# Patient Record
Sex: FEMALE | Race: WHITE | Employment: FULL TIME | URBAN - METROPOLITAN AREA
[De-identification: names, ages, dates, MRNs, and addresses within clinical notes are randomized per-mention and may not be internally consistent; named-entity substitution may affect disease eponyms.]

---

## 2021-11-09 ENCOUNTER — HOSPITAL ENCOUNTER (OUTPATIENT)
Dept: MAMMOGRAPHY | Facility: CLINIC | Age: 31
Discharge: HOME | End: 2021-11-09
Payer: COMMERCIAL

## 2021-11-09 ENCOUNTER — HOSPITAL ENCOUNTER (OUTPATIENT)
Dept: ULTRASOUND IMAGING | Facility: CLINIC | Age: 31
Discharge: HOME | End: 2021-11-09
Payer: COMMERCIAL

## 2021-11-09 VITALS — WEIGHT: 203 LBS | BODY MASS INDEX: 32.62 KG/M2 | HEIGHT: 66 IN

## 2021-11-09 DIAGNOSIS — N64.4 BREAST PAIN: ICD-10-CM

## 2021-11-09 DIAGNOSIS — R92.8 ABNORMAL MAMMOGRAM: ICD-10-CM

## 2021-11-09 DIAGNOSIS — N64.4 BREAST PAIN, LEFT: ICD-10-CM

## 2021-11-09 PROCEDURE — 77066 DX MAMMO INCL CAD BI: CPT

## 2021-11-09 PROCEDURE — 76642 ULTRASOUND BREAST LIMITED: CPT

## 2021-11-09 PROCEDURE — G0279 TOMOSYNTHESIS, MAMMO: HCPCS

## 2023-01-26 ENCOUNTER — TELEPHONE (OUTPATIENT)
Dept: OBGYN CLINIC | Facility: CLINIC | Age: 33
End: 2023-01-26

## 2023-01-26 NOTE — TELEPHONE ENCOUNTER
1/26/23-Pt to have last pap and progress notes faxed from [facility] prior to her WA. She will be completing a MR release form for us to fax to  OB/GYN to get info from her miscarriage.

## 2023-03-27 ENCOUNTER — TELEPHONE (OUTPATIENT)
Dept: OBGYN CLINIC | Facility: CLINIC | Age: 33
End: 2023-03-27

## 2023-03-27 DIAGNOSIS — N91.2 AMENORRHEA: Primary | ICD-10-CM

## 2023-03-27 DIAGNOSIS — Z87.59 HISTORY OF ECTOPIC PREGNANCY: ICD-10-CM

## 2023-03-27 NOTE — TELEPHONE ENCOUNTER
Jaimee received (+) HPT  LMP 23 (4 1 weeks)  A1  She did have an Ectopic pregnancy in 10/2022;treated with Methotrexate  Bldtype: AB neg; received Rhogam on 10/05/22  Jaimee is requesting to get HCG testing done at [facility] due to PMHx of Ectopic  First PN scheduled for 23  Sent message as to HCG testing orders

## 2023-03-27 NOTE — TELEPHONE ENCOUNTER
3/27/23 patient has an upcoming FPN appt 9:20 am with Nurse and 10:00 with Dr Catarino Campos on 4/25/2023  This is patient's second pregnancy  She is a previous [facility] patient last seen 2/24/2020  No MR needed

## 2023-03-27 NOTE — TELEPHONE ENCOUNTER
Please order serum HCG for today and then in 2-3 days  Once levels rise to discrimination zone will need U/S to document IUP

## 2023-03-27 NOTE — TELEPHONE ENCOUNTER
Informed Jaimee about getting HCG testing done at [facility] Jaimee is at work today but will go tomorrow morning for HCG lab testing  She verbally understood about repeating test on Thursday  Transmitted HCG orders to [employer]

## 2023-03-29 LAB — HCG INTACT+B SERPL-ACNC: 80 MIU/ML

## 2023-03-31 LAB — HCG INTACT+B SERPL-ACNC: 193 MIU/ML

## 2023-04-04 DIAGNOSIS — Z87.59 HISTORY OF ECTOPIC PREGNANCY: Primary | ICD-10-CM

## 2023-04-05 LAB — HCG INTACT+B SERPL-ACNC: 1141 MIU/ML

## 2023-04-06 NOTE — TELEPHONE ENCOUNTER
One delivery through [facility] No pregnancies or deliveries done @[facility], closing encounter.

## 2023-04-08 LAB — HCG INTACT+B SERPL-ACNC: 2120 MIU/ML

## 2023-04-25 ENCOUNTER — TELEPHONE (OUTPATIENT)
Dept: OBGYN CLINIC | Facility: CLINIC | Age: 33
End: 2023-04-25

## 2023-04-25 ENCOUNTER — INITIAL PRENATAL (OUTPATIENT)
Dept: OBGYN CLINIC | Facility: CLINIC | Age: 33
End: 2023-04-25

## 2023-04-25 VITALS
WEIGHT: 210.4 LBS | BODY MASS INDEX: 33.82 KG/M2 | HEIGHT: 66 IN | DIASTOLIC BLOOD PRESSURE: 84 MMHG | SYSTOLIC BLOOD PRESSURE: 122 MMHG

## 2023-04-25 DIAGNOSIS — O99.211 OBESITY AFFECTING PREGNANCY IN FIRST TRIMESTER: ICD-10-CM

## 2023-04-25 DIAGNOSIS — O26.899 RH NEGATIVE STATE IN ANTEPARTUM PERIOD: ICD-10-CM

## 2023-04-25 DIAGNOSIS — Z36.89 ENCOUNTER FOR OTHER SPECIFIED ANTENATAL SCREENING: Primary | ICD-10-CM

## 2023-04-25 DIAGNOSIS — Z67.91 RH NEGATIVE STATE IN ANTEPARTUM PERIOD: ICD-10-CM

## 2023-04-25 DIAGNOSIS — O02.1 MISSED ABORTION: Primary | ICD-10-CM

## 2023-04-25 PROBLEM — O34.211 MATERNAL CARE DUE TO LOW TRANSVERSE UTERINE SCAR FROM PREVIOUS CESAREAN DELIVERY: Status: ACTIVE | Noted: 2023-04-25

## 2023-04-25 PROBLEM — Z98.891 HISTORY OF C-SECTION: Status: ACTIVE | Noted: 2023-04-25

## 2023-04-25 LAB
SL AMB  POCT GLUCOSE, UA: NEGATIVE
SL AMB POCT URINE PROTEIN: NEGATIVE

## 2023-04-25 NOTE — PROGRESS NOTES
"[address], [address], [facility], [address]    Assessment/Plan:  1  Missed   A/P:   Reviewed ultrasound today does not show FHT and larger pregnancy, which would be expected now 2 weeks after ultrasound done 2023 confirmed IUP  Unfortunately this is consistent with a miscarriage  Jaimee is appropriately upset today and tearful  Reviewed miscarriage in early first trimester occurs in approx 1:5 pregnancies and is usually due to chromosomal abnormality that occurs early in development of fetus that is incompatable with life  This is not due to something the patient ate, drank or did  This is completely spontaneous, although it does occur more frequently as women age  Reviewed options for management include expectant management, which can take a couple weeks; vs  medical management, taking a vaginal medication to help complete miscarriage; vs  surgical management with dilation and evacuation in hospital under IV sedation  Reviewed w/ patient each in detail, including risks and recovery, as well as expected bleeding after each  Pt wishes to think about the options  We did review and sign consent today for D&E, in case she decided to proceed with that option in the next couple days  Discussed that this can be scheduled usually the day after she decides and is added on to the existing surgery schedule  She will contact me in next day or so with how she would like to proceed  -     ABO/Rh; Future  -     Antibody screen; Future  -     ABO/Rh  -     Antibody screen  -     AMB US OB < 14 weeks single or first gestation level 1    2  Rh negative state in antepartum period  Comments:  order provided for T&S and antibody testing  Subjective:   Pau Andrade is a 34 y o   female  CC: \"I'm here for my first prenatal visit\"      HPI:   Jaimee presents today for her first Prenatal visit - LMP 2023    She does have a h/o ectopic pregnancy and did " have an early ultrasound performed 2023 which confirmed IUP with presence of gestational sac and yolk sac  No FHT or fetal pole noted but that was not unexpected at that early [place]  Today she states no N/V  No vaginal bleeding  Gyn History  Patient's last menstrual period was 2023  Last pap smear: 2017    She  reports being sexually active and has had partner(s) who are male  She reports using the following method of birth control/protection: None  OB History  [id number]    Past Medical History:  10/2022: History of ectopic pregnancy      Comment:  treated with Methotrexate  No date: Obesity     Past Surgical History:  10/8/2016:  SECTION  No date: WISDOM TOOTH EXTRACTION     Social History     Tobacco Use   • Smoking status: Former     Packs/day: 1 00     Years: 5 00     Pack years: 5 00     Types: Cigarettes     Start date: 2007     Quit date: 2013     Years since quitting: 10 0   • Smokeless tobacco: Never   • Tobacco comments:     Quit 10 years ago   Vaping Use   • Vaping Use: Never used   Substance Use Topics   • Alcohol use: Not Currently     Alcohol/week: 1 0 standard drink     Types: 1 Cans of beer per week   • Drug use: Never          Current Outpatient Medications:   •  cholecalciferol (VITAMIN D3) 400 units tablet, Take by mouth daily, Disp: , Rfl:   •  Prenatal Vit-Fe Fumarate-FA (PRENATAL 1+1 PO), Take by mouth, Disp: , Rfl:     She has No Known Allergies       ROS: Review of Systems   Constitutional: Negative  Gastrointestinal: Negative  Genitourinary: Negative  Psychiatric/Behavioral: Negative  Objective:  LMP 2023 /85; weight 210lb, BMI 33 96     Physical Exam  Constitutional:       Appearance: Normal appearance  Cardiovascular:      Rate and Rhythm: Normal rate  Pulmonary:      Effort: Pulmonary effort is normal    Chest:   Breasts:     Right: Normal       Left: Normal    Abdominal:      Palpations: Abdomen is soft  Tenderness: There is no abdominal tenderness  Genitourinary:     General: Normal vulva  Vagina: Normal       Cervix: Normal       Uterus: Normal  Not enlarged and not tender  Adnexa:         Right: No mass or tenderness  Left: No mass or tenderness  Rectum: No external hemorrhoid  Musculoskeletal:      Right foot: Normal       Left foot: Normal    Neurological:      Mental Status: She is alert  Psychiatric:         Mood and Affect: Mood normal          Behavior: Behavior normal            FIRST TRIMESTER OBSTETRIC ULTRASOUND  04/25/23     Tommy Mahoney MD     INDICATION:  viability     COMPARISON: 4/11/2023 - ruled out ectopic pregnancy     TECHNIQUE:   Transvaginal imaging was performed to assess the gestation and myometrial/endometrial architecture       The study includes volumetric sweeps and traditional still imaging technique       FINDINGS:     Gestational sac and Yolk sac noted with fetal pole but no FHT      YOLK SAC:  Present and normal in size and appearance  MEAN CROWN RUMP LENGTH:  2 4 mm = 5 weeks 5 days   AMNIOTIC FLUID/SAC SHAPE:  Within expected normal range      UTERUS:   No uterine abnormalities noted      IMPRESSION:     Missed miscarriage based on u/s today compared to 4/11/2023      Patient's last menstrual period was 2/27/2023 (exact date)  Gestational age based on LMP: 6w2d   Gestational age based on US: 11w7d  Given ultrasound done 4/11/2023 showing gestational sac and yolk sac, we should be able to see FHT on ultrasound today if this was a normal pregnancy

## 2023-04-25 NOTE — PROGRESS NOTES
OB INTAKE INTERVIEW  Patient is 34 y  o who presents for OB intake at 8 1wks  She is accompanied by: Spouse  The father of her baby Saige Vaughan) is involved in the pregnancy    Last Menstrual Period: 2023      Signs/Symptoms of Pregnancy  Current pregnancy symptoms: Breast tenderness, fatigue  Constipation no  Headaches no  Cramping/spotting no  PICA cravings no    Diabetes-  Body mass index is 33 96 kg/m²  If patient has 1 or more, please order early 1 hour GTT  History of GDM no  BMI >30 YES  History of PCOS or current metformin use no  History of LGA/macrosomic infant (4000g/9lbs) no    If patient has 2 or more, please order early 1 hour GTT  AMA no  First degree relative with type 2 diabetes YES  History of chronic HTN, hyperlipidemia, elevated A1C no  High risk race (, , ,  or ) no    Hypertension- if you answer yes, please order preeclampsia labs (cbc, comprehensive metabolic panel, urine protein creatinine ratio, uric acid)  History of of chronic HTN no  History of gestational HTN no  History of preeclampsia, eclampsia, or HELLP syndrome no  History of diabetes no  History of lupus, autoimmune disease, kidney disease, antiphospholipid syndrome, rheumatoid arthritis, sjogren's syndrome no    Thyroid- if yes order TSH with reflex T4 (Unless TSH value on file within last 4 weeks then do not need to order)  History of thyroid disease no    Bleeding Disorder or Hx of DVT-patient or first degree relative with history of  Order the following if not done previously     (Factor V, antithrombin III, prothrombin gene mutation, protein C and S Ag, lupus anticoagulant, anticardiolipin, beta-2 glycoprotein)   no    OB/GYN-  History of abnormal pap smear no  History of HPV no  History of Herpes/HSV no  History of other STI (gonorrhea, chlamydia, trich) (or current partner with Hep B, Hep C, or HIV) no  History of prior  no  History of prior  YES  History of  delivery prior to 36 weeks 6 days no  History of blood transfusion no  Ok for blood transfusion -Yes     Substance screening-   History of tobacco use YES  Currently using tobacco no  Currently using alcohol no  Presently using drugs no  Past drug use (if yes, order urine drug screening panel)  no  IV drug use (if yes, order urine drug screening panel) no  Partner drug use (if yes, order urine drug screening panel) no  Parent/Family drug use (do not order urine drug screening panel just for family hx) no    Depression Screening-  PHQ-9 Score: (1)    MRSA Screening-   Does the pt have a hx of MRSA? no  If yes- please follow MRSA protocol and obtain a nasal swab for MRSA culture at 12 week visit  Immunizations:  Influenza vaccine given this season (ask date) -No   Discussed Tdap vaccine (ask date) -Yes  Discussed COVID Vaccine (request pt upload card or bring to next visit) -Yes, vaccinated    Genetic/MFM-  Do you or your partner have a history of any of the following in yourselves or first degree relatives? Cystic fibrosis no  Spinal muscular atrophy no  Hemoglobinopathy/Sickle Cell/Thalassemia no  Fragile X Intellectual Disability no    If yes, discuss carrier screening and recommend consultation with MFM/genetic counseling  If no, discuss option for carrier screening and/or genetic testing with Nuchal Ultrasound  Patient interested -yes pt interested in NIPT and CF and SMA carrier screening  The patient has a history now or in prior pregnancy notable for:  Prior LTCS      Details that I feel the provider should be aware of: Obesity    The patient was oriented to our practice, reviewed delivering physicians and [place] in [facility] for Delivery  All questions were answered      Interviewed by: Ashlee Gan RN

## 2023-04-26 ENCOUNTER — ANESTHESIA EVENT (OUTPATIENT)
Dept: PERIOP | Facility: HOSPITAL | Age: 33
End: 2023-04-26

## 2023-04-26 LAB
ABO GROUP BLD: NORMAL
BLD GP AB SCN SERPL QL: NEGATIVE
RH BLD: NEGATIVE

## 2023-04-26 NOTE — PRE-PROCEDURE INSTRUCTIONS
Pre-Surgery Instructions:   Medication Instructions   • cholecalciferol (VITAMIN D3) 400 units tablet Hold day of surgery  • Prenatal Vit-Fe Fumarate-FA (PRENATAL 1+1 PO) Hold day of surgery  Medication instructions for day surgery reviewed  Please use only a sip of water to take your instructed medications  Avoid all over the counter vitamins, supplements and NSAIDS for one week prior to surgery per anesthesia guidelines  Tylenol is ok to take as needed  You will receive a call one business day prior to surgery with an arrival time and hospital directions  If your surgery is scheduled on a Monday, the hospital will be calling you on the Friday prior to your surgery  If you have not heard from anyone by 8pm, please call the hospital supervisor through the hospital  at [phone]  [facility] [phone])  Do not eat or drink anything after midnight the night before your surgery, including candy, mints, lifesavers, or chewing gum  Do not drink alcohol 24hrs before your surgery  Try not to smoke at least 24hrs before your surgery  Follow the pre surgery showering instructions as listed in the [facility] Surgical Experience Booklet” or otherwise provided by your surgeon's office  Do not shave the surgical area 24 hours before surgery  Do not apply any lotions, creams, including makeup, cologne, deodorant, or perfumes after showering on the day of your surgery  No contact lenses, eye make-up, or artificial eyelashes  Remove nail polish, including gel polish, and any artificial, gel, or acrylic nails if possible  Remove all jewelry including rings and body piercing jewelry  Wear causal clothing that is easy to take on and off  Consider your type of surgery  Keep any valuables, jewelry, piercings at home  Please bring any specially ordered equipment (sling, braces) if indicated  Arrange for a responsible person to drive you to and from the hospital on the day of your surgery   Visitor Guidelines discussed  Call the surgeon's office with any new illnesses, exposures, or additional questions prior to surgery  Please reference your [facility] Surgical Experience Booklet” for additional information to prepare for your upcoming surgery

## 2023-04-27 ENCOUNTER — HOSPITAL ENCOUNTER (OUTPATIENT)
Facility: HOSPITAL | Age: 33
Setting detail: OUTPATIENT SURGERY
Discharge: HOME | End: 2023-04-27
Attending: OBSTETRICS & GYNECOLOGY | Admitting: OBSTETRICS & GYNECOLOGY

## 2023-04-27 ENCOUNTER — ANESTHESIA (OUTPATIENT)
Dept: PERIOP | Facility: HOSPITAL | Age: 33
End: 2023-04-27

## 2023-04-27 VITALS
HEART RATE: 92 BPM | OXYGEN SATURATION: 100 % | HEIGHT: 66 IN | WEIGHT: 208.8 LBS | DIASTOLIC BLOOD PRESSURE: 77 MMHG | SYSTOLIC BLOOD PRESSURE: 124 MMHG | RESPIRATION RATE: 16 BRPM | BODY MASS INDEX: 33.56 KG/M2 | TEMPERATURE: 97.5 F

## 2023-04-27 DIAGNOSIS — O02.1 MISSED ABORTION: ICD-10-CM

## 2023-04-27 LAB
ABO GROUP BLD: NORMAL
BASOPHILS # BLD AUTO: 0.03 THOUSANDS/ΜL (ref 0–0.1)
BASOPHILS NFR BLD AUTO: 1 % (ref 0–1)
BLD GP AB SCN SERPL QL: NEGATIVE
EOSINOPHIL # BLD AUTO: 0.2 THOUSAND/ΜL (ref 0–0.61)
EOSINOPHIL NFR BLD AUTO: 4 % (ref 0–6)
ERYTHROCYTE [DISTWIDTH] IN BLOOD BY AUTOMATED COUNT: 13.2 % (ref 11.6–15.1)
HCT VFR BLD AUTO: 36.3 % (ref 34.8–46.1)
HGB BLD-MCNC: 12 G/DL (ref 11.5–15.4)
IMM GRANULOCYTES # BLD AUTO: 0.02 THOUSAND/UL (ref 0–0.2)
IMM GRANULOCYTES NFR BLD AUTO: 0 % (ref 0–2)
LYMPHOCYTES # BLD AUTO: 1.57 THOUSANDS/ΜL (ref 0.6–4.47)
LYMPHOCYTES NFR BLD AUTO: 32 % (ref 14–44)
MCH RBC QN AUTO: 28.9 PG (ref 26.8–34.3)
MCHC RBC AUTO-ENTMCNC: 33.1 G/DL (ref 31.4–37.4)
MCV RBC AUTO: 88 FL (ref 82–98)
MONOCYTES # BLD AUTO: 0.31 THOUSAND/ΜL (ref 0.17–1.22)
MONOCYTES NFR BLD AUTO: 6 % (ref 4–12)
NEUTROPHILS # BLD AUTO: 2.81 THOUSANDS/ΜL (ref 1.85–7.62)
NEUTS SEG NFR BLD AUTO: 57 % (ref 43–75)
NRBC BLD AUTO-RTO: 0 /100 WBCS
PLATELET # BLD AUTO: 194 THOUSANDS/UL (ref 149–390)
PMV BLD AUTO: 9.6 FL (ref 8.9–12.7)
RBC # BLD AUTO: 4.15 MILLION/UL (ref 3.81–5.12)
RH BLD: NEGATIVE
SPECIMEN EXPIRATION DATE: NORMAL
WBC # BLD AUTO: 4.94 THOUSAND/UL (ref 4.31–10.16)

## 2023-04-27 RX ORDER — DEXAMETHASONE SODIUM PHOSPHATE 10 MG/ML
INJECTION, SOLUTION INTRAMUSCULAR; INTRAVENOUS AS NEEDED
Status: DISCONTINUED | OUTPATIENT
Start: 2023-04-27 | End: 2023-04-27

## 2023-04-27 RX ORDER — PROPOFOL 10 MG/ML
INJECTION, EMULSION INTRAVENOUS CONTINUOUS PRN
Status: DISCONTINUED | OUTPATIENT
Start: 2023-04-27 | End: 2023-04-27

## 2023-04-27 RX ORDER — LIDOCAINE HYDROCHLORIDE 10 MG/ML
INJECTION, SOLUTION EPIDURAL; INFILTRATION; INTRACAUDAL; PERINEURAL AS NEEDED
Status: DISCONTINUED | OUTPATIENT
Start: 2023-04-27 | End: 2023-04-27 | Stop reason: HOSPADM

## 2023-04-27 RX ORDER — ONDANSETRON 2 MG/ML
4 INJECTION INTRAMUSCULAR; INTRAVENOUS ONCE AS NEEDED
Status: DISCONTINUED | OUTPATIENT
Start: 2023-04-27 | End: 2023-04-27 | Stop reason: HOSPADM

## 2023-04-27 RX ORDER — KETOROLAC TROMETHAMINE 30 MG/ML
INJECTION, SOLUTION INTRAMUSCULAR; INTRAVENOUS AS NEEDED
Status: DISCONTINUED | OUTPATIENT
Start: 2023-04-27 | End: 2023-04-27

## 2023-04-27 RX ORDER — ACETAMINOPHEN 325 MG/1
975 TABLET ORAL EVERY 6 HOURS PRN
Status: DISCONTINUED | OUTPATIENT
Start: 2023-04-27 | End: 2023-04-28 | Stop reason: HOSPADM

## 2023-04-27 RX ORDER — ONDANSETRON 2 MG/ML
INJECTION INTRAMUSCULAR; INTRAVENOUS AS NEEDED
Status: DISCONTINUED | OUTPATIENT
Start: 2023-04-27 | End: 2023-04-27

## 2023-04-27 RX ORDER — FENTANYL CITRATE/PF 50 MCG/ML
25 SYRINGE (ML) INJECTION
Status: DISCONTINUED | OUTPATIENT
Start: 2023-04-27 | End: 2023-04-27 | Stop reason: HOSPADM

## 2023-04-27 RX ORDER — FENTANYL CITRATE 50 UG/ML
INJECTION, SOLUTION INTRAMUSCULAR; INTRAVENOUS AS NEEDED
Status: DISCONTINUED | OUTPATIENT
Start: 2023-04-27 | End: 2023-04-27

## 2023-04-27 RX ORDER — DOXYCYCLINE 100 MG/10ML
200 INJECTION, POWDER, LYOPHILIZED, FOR SOLUTION INTRAVENOUS ONCE
Status: DISCONTINUED | OUTPATIENT
Start: 2023-04-27 | End: 2023-04-27 | Stop reason: SDUPTHER

## 2023-04-27 RX ORDER — PROPOFOL 10 MG/ML
INJECTION, EMULSION INTRAVENOUS AS NEEDED
Status: DISCONTINUED | OUTPATIENT
Start: 2023-04-27 | End: 2023-04-27

## 2023-04-27 RX ORDER — MIDAZOLAM HYDROCHLORIDE 2 MG/2ML
INJECTION, SOLUTION INTRAMUSCULAR; INTRAVENOUS AS NEEDED
Status: DISCONTINUED | OUTPATIENT
Start: 2023-04-27 | End: 2023-04-27

## 2023-04-27 RX ORDER — SODIUM CHLORIDE, SODIUM LACTATE, POTASSIUM CHLORIDE, CALCIUM CHLORIDE 600; 310; 30; 20 MG/100ML; MG/100ML; MG/100ML; MG/100ML
INJECTION, SOLUTION INTRAVENOUS CONTINUOUS PRN
Status: DISCONTINUED | OUTPATIENT
Start: 2023-04-27 | End: 2023-04-27

## 2023-04-27 RX ADMIN — FENTANYL CITRATE 25 MCG: 50 INJECTION, SOLUTION INTRAMUSCULAR; INTRAVENOUS at 09:27

## 2023-04-27 RX ADMIN — FENTANYL CITRATE 25 MCG: 50 INJECTION, SOLUTION INTRAMUSCULAR; INTRAVENOUS at 09:23

## 2023-04-27 RX ADMIN — PROPOFOL 200 MCG/KG/MIN: 10 INJECTION, EMULSION INTRAVENOUS at 09:21

## 2023-04-27 RX ADMIN — SODIUM CHLORIDE, SODIUM LACTATE, POTASSIUM CHLORIDE, AND CALCIUM CHLORIDE: .6; .31; .03; .02 INJECTION, SOLUTION INTRAVENOUS at 08:54

## 2023-04-27 RX ADMIN — FENTANYL CITRATE 25 MCG: 50 INJECTION, SOLUTION INTRAMUSCULAR; INTRAVENOUS at 09:25

## 2023-04-27 RX ADMIN — DEXAMETHASONE SODIUM PHOSPHATE 10 MG: 10 INJECTION, SOLUTION INTRAMUSCULAR; INTRAVENOUS at 09:23

## 2023-04-27 RX ADMIN — HUMAN RHO(D) IMMUNE GLOBULIN 300 MCG: 300 INJECTION, SOLUTION INTRAMUSCULAR at 11:05

## 2023-04-27 RX ADMIN — KETOROLAC TROMETHAMINE 30 MG: 30 INJECTION, SOLUTION INTRAMUSCULAR; INTRAVENOUS at 09:37

## 2023-04-27 RX ADMIN — MIDAZOLAM 2 MG: 1 INJECTION INTRAMUSCULAR; INTRAVENOUS at 09:13

## 2023-04-27 RX ADMIN — PROPOFOL 100 MG: 10 INJECTION, EMULSION INTRAVENOUS at 09:21

## 2023-04-27 RX ADMIN — DOXYCYCLINE 100 ML/HR: 100 INJECTION, POWDER, LYOPHILIZED, FOR SOLUTION INTRAVENOUS at 09:13

## 2023-04-27 RX ADMIN — FENTANYL CITRATE 25 MCG: 50 INJECTION, SOLUTION INTRAMUSCULAR; INTRAVENOUS at 09:37

## 2023-04-27 RX ADMIN — ONDANSETRON 4 MG: 2 INJECTION INTRAMUSCULAR; INTRAVENOUS at 09:23

## 2023-04-27 NOTE — H&P
"HISTORY AND PHYSICAL  [facility] - [address] [place] [address], [address], [place] [address]     Assessment/Plan:  1  Missed   A/P:   Reviewed ultrasound today does not show FHT and larger pregnancy, which would be expected now 2 weeks after ultrasound done 2023 confirmed IUP  Unfortunately this is consistent with a miscarriage  Jaimee is appropriately upset today and tearful  Reviewed miscarriage in early first trimester occurs in approx 1:5 pregnancies and is usually due to chromosomal abnormality that occurs early in development of fetus that is incompatable with life  This is not due to something the patient ate, drank or did  This is completely spontaneous, although it does occur more frequently as women age  Reviewed options for management include expectant management, which can take a couple weeks; vs  medical management, taking a vaginal medication to help complete miscarriage; vs  surgical management with dilation and evacuation in hospital under IV sedation  Reviewed w/ patient each in detail, including risks and recovery, as well as expected bleeding after each  Pt wishes to think about the options  We did review and sign consent today for D&E, in case she decided to proceed with that option in the next couple days  Discussed that this can be scheduled usually the day after she decides and is added on to the existing surgery schedule  She will contact me in next day or so with how she would like to proceed      -     ABO/Rh; Future  -     Antibody screen; Future  -     ABO/Rh  -     Antibody screen  -     AMB US OB < 14 weeks single or first gestation level 1     2  Rh negative state in antepartum period  Comments:  order provided for T&S and antibody testing            Subjective:   Brenda Clay is a 34 y o   female  CC: \"I'm here for my first prenatal visit\"        HPI:   Jaimee presents today for her first Prenatal visit - LMP 2023    She does have a h/o " ectopic pregnancy and did have an early ultrasound performed 2023 which confirmed IUP with presence of gestational sac and yolk sac  No FHT or fetal pole noted but that was not unexpected at that early [place]      Today she states no N/V  No vaginal bleeding         Gyn History  Patient's last menstrual period was 2023      Last pap smear: 2017     She  reports being sexually active and has had partner(s) who are male  She reports using the following method of birth control/protection: None      OB History       Past Medical History:  10/2022: History of ectopic pregnancy      Comment:  treated with Methotrexate  No date: Obesity      Past Surgical History:  10/8/2016:  SECTION  No date: WISDOM TOOTH EXTRACTION      Social History            Tobacco Use   • Smoking status: Former       Packs/day: 1 00       Years: 5 00       Pack years: 5 00       Types: Cigarettes       Start date: 2007       Quit date: 2013       Years since quitting: 10 0   • Smokeless tobacco: Never   • Tobacco comments:       Quit 10 years ago   Vaping Use   • Vaping Use: Never used   Substance Use Topics   • Alcohol use: Not Currently       Alcohol/week: 1 0 standard drink       Types: 1 Cans of beer per week   • Drug use: Never            Current Outpatient Medications:   •  cholecalciferol (VITAMIN D3) 400 units tablet, Take by mouth daily, Disp: , Rfl:   •  Prenatal Vit-Fe Fumarate-FA (PRENATAL 1+1 PO), Take by mouth, Disp: , Rfl:      She has No Known Allergies        ROS: Review of Systems   Constitutional: Negative  Gastrointestinal: Negative  Genitourinary: Negative  Psychiatric/Behavioral: Negative           Objective:  LMP 2023 /85; weight 210lb, BMI 33 96      Physical Exam  Constitutional:       Appearance: Normal appearance  Cardiovascular:      Rate and Rhythm: Normal rate     Pulmonary:      Effort: Pulmonary effort is normal    Chest:   Breasts:     Right: Normal  Left: Normal    Abdominal:      Palpations: Abdomen is soft  Tenderness: There is no abdominal tenderness  Genitourinary:     General: Normal vulva  Vagina: Normal       Cervix: Normal       Uterus: Normal  Not enlarged and not tender  Adnexa:         Right: No mass or tenderness  Left: No mass or tenderness  Rectum: No external hemorrhoid  Musculoskeletal:      Right foot: Normal       Left foot: Normal    Neurological:      Mental Status: She is alert  Psychiatric:         Mood and Affect: Mood normal          Behavior: Behavior normal              FIRST TRIMESTER OBSTETRIC ULTRASOUND  04/25/23     Catalina Andrade MD     INDICATION:  viability     COMPARISON: 4/11/2023 - ruled out ectopic pregnancy     TECHNIQUE:   Transvaginal imaging was performed to assess the gestation and myometrial/endometrial architecture       The study includes volumetric sweeps and traditional still imaging technique       FINDINGS:     Gestational sac and Yolk sac noted with fetal pole but no FHT      YOLK SAC:  Present and normal in size and appearance  MEAN CROWN RUMP LENGTH:  2 4 mm = 5 weeks 5 days   AMNIOTIC FLUID/SAC SHAPE:  Within expected normal range      UTERUS:   No uterine abnormalities noted      IMPRESSION:     Missed miscarriage based on u/s today compared to 4/11/2023      Patient's last menstrual period was 2/27/2023 (exact date)  Gestational age based on LMP: 6w2d   Gestational age based on US: 11w7d  Given ultrasound done 4/11/2023 showing gestational sac and yolk sac, we should be able to see FHT on ultrasound today if this was a normal pregnancy

## 2023-04-27 NOTE — OP NOTE
OPERATIVE REPORT  PATIENT NAME: Kasandra Perez    :  1990  MRN: [id number]  Pt Location: [facility] OR ROOM 04    SURGERY DATE: 2023    Surgeon(s) and Role:     * Ezra Nageotte, MD - Primary    Preop Diagnosis:  Missed  [O02 1]    Post-Op Diagnosis Codes:     * Missed  [O02 1]    Procedure(s):  DILATATION AND EVACUATION (D&E) (8WEEKS)    Specimen(s):  ID Type Source Tests Collected by Time Destination   1 :  Tissue Products of Conception TISSUE EXAM Ezra Nageotte, MD 2023 [id number]        Estimated Blood Loss:   Minimal    Drains:  * No LDAs found *    Anesthesia Type:   IV Sedation with Anesthesia    Operative Indications:  Missed  [O02 1]      Operative Findings:  Cervix closed    Complications:   None    Procedure and Technique:  The patient was taken to the operating room where she was administered IV sedation by anesthesia  She received Doxycycline 200mg IV prior to start of procedure  She was prepped and draped in the usual sterile fashion in the dorsal lithotomy position  A speculum was placed into the vagina to allow for good visualization of the cervix  A single-toothed tenaculum was then placed in the anterior lip of the cervix  10cc of 1% lidocaine was used at 4:00 and 8:00 as a pericervical block  The cervix initially noted to be closed  It was dilated to accommodate a #25 Ukrainian shila dilator  A 8mm curved suction curette was then attached to suction and placed into the uterus  The suction was activated and curette rotated to remove blood and tissue from the uterine cavity  Multiple passes were made until no further tissue or blood was noted  The suction curette was removed and a metal curette was used gently to make sure no further tissue in the uterus and a cry was noted  The suction curette was placed one more time to remove any remained blood or tissue    Upon completion, the single-toothed tenaculum was removed from the anterior lip of the cervix and good hemostasis was noted  All instruments were removed from the vagina  Instrument, needle and sponge counts were correct x 2  The patient tolerated the procedure well and was taken to the recovery room in stable condition  Patient's family was called and findings reviewed  I was present for the entire procedure      Patient Disposition:  PACU         SIGNATURE: Mahad Quevedo MD  DATE: April 27, 2023  TIME: 11:23 AM

## 2023-04-27 NOTE — ANESTHESIA PREPROCEDURE EVALUATION
Procedure:  DILATATION AND EVACUATION (D&E) (8WEEKS) (Uterus)    Relevant Problems   ANESTHESIA (within normal limits)        Physical Exam    Airway    Mallampati score: I         Dental   No notable dental hx     Cardiovascular  Cardiovascular exam normal    Pulmonary  Pulmonary exam normal     Other Findings        Anesthesia Plan  ASA Score- 1     Anesthesia Type- general with ASA Monitors  Additional Monitors:   Airway Plan: LMA  Comment: I discussed risks (reviewed with patient on the anesthesia consent form), benefits and alternatives for General Anesthesia  These risks did include breathing tube remaining in place if not strong enough, PONV, damage to lips and teeth, sore throat, eye injury or blindness, risk of heart attack or stroke that may lead to death          Plan Factors-    Chart reviewed  Patient summary reviewed  Induction- intravenous  Postoperative Plan- Plan for postoperative opioid use  Informed Consent- Anesthetic plan and risks discussed with patient  I personally reviewed this patient with the CRNA  Discussed and agreed on the Anesthesia Plan with the CRNA  Haley Flaherty

## 2023-04-27 NOTE — ANESTHESIA POSTPROCEDURE EVALUATION
Post-Op Assessment Note    CV Status:  Stable  Pain Score: 0    Pain management: adequate     Mental Status:  Arousable and sleepy   Hydration Status:  Stable   PONV Controlled:  Controlled   Airway Patency:  Patent      Post Op Vitals Reviewed: Yes      Staff: CRNA         No notable events documented      BP   124/77   Temp 97 6   Pulse 77   Resp 15   SpO2 99

## 2023-04-27 NOTE — DISCHARGE INSTR - AVS FIRST PAGE
Please relax for the next 12/24 hours  It is okay to shower  Take Motrin (ibuprofen) 600mg every 6 hours for cramping or pain  You may take Tylenol (acetaminophen) 650mg every 6 hours, in addition to the Motrin if needed  Light bleeding is to be expected, call office if bleeding heavier than a period  No baths/swimming, tampons or intercourse until bleeding completely stops  Please call office if you develop fever > 100 5, chills, severe abdominal pain not improved with medications above, vomiting, or heavy bleeding (heavier than a period)       I recommend waiting 1 normal period cycle before you start trying to conceive again  You can take a baby aspirin (81mg available over the counter) daily while trying to conceive and early in pregnancy, this may increase your chance of successful pregnancy following miscarriage

## 2023-04-27 NOTE — INTERVAL H&P NOTE
H&P reviewed  After contemplation Tuesday she decided to proceed with D&E for management of miscarriage  She denies any bleeding or changes since Tuesday  After examining the patient I find no changes in the patients condition since the H&P had been written  She will receive IV doxycyline intraop and Rhogam after procedure      Vitals:    04/27/23 0756   BP: 126/76   Pulse: 96   Resp: 20   Temp: 98 5 °F (36 9 °C)   SpO2: 100%

## 2023-05-02 ENCOUNTER — TELEPHONE (OUTPATIENT)
Dept: OBGYN CLINIC | Facility: CLINIC | Age: 33
End: 2023-05-02

## 2023-05-02 DIAGNOSIS — N39.0 URINARY TRACT INFECTION: Primary | ICD-10-CM

## 2023-05-02 RX ORDER — NITROFURANTOIN 25; 75 MG/1; MG/1
100 CAPSULE ORAL 2 TIMES DAILY
Qty: 10 CAPSULE | Refills: 0 | Status: SHIPPED | OUTPATIENT
Start: 2023-05-02 | End: 2023-05-07

## 2023-05-02 NOTE — TELEPHONE ENCOUNTER
Jaimee had D&E on Thursday 04/27  She is feeling some lower abd discomfort with urination  Denies any fever, chills, burning or frequency of urination  Vaginal bleeding is light  Jaimee is increasing fluids & Cranberry juice  She is questioning, if has bladder infection  Sent message to Dr Bashir Morrell

## 2023-05-02 NOTE — TELEPHONE ENCOUNTER
Please have patient get UA/UCx today and start Macrobid for presumptive treatment of UTI  Urine specimen should be collected prior to taking first dose  No need to await result before starting treatment  Orders for UA/UCx sent to [facility]  Rx for Macrobid sent to pharmacy on file       Shila Buchanan MD  5/2/2023 11:22 AM

## 2023-05-02 NOTE — TELEPHONE ENCOUNTER
Informed Jaimee of urine testing and Abx sent to pharmacy  She verbally understood to start ABx after giving urine specimen at [facility]

## 2023-05-03 LAB
APPEARANCE UR: CLEAR
BACTERIA UR CULT: NORMAL
BACTERIA URNS QL MICRO: NORMAL
BILIRUB UR QL STRIP: NEGATIVE
CASTS URNS QL MICRO: NORMAL /LPF
COLOR UR: YELLOW
EPI CELLS #/AREA URNS HPF: NORMAL /HPF (ref 0–10)
GLUCOSE UR QL: NEGATIVE
HGB UR QL STRIP: NEGATIVE
KETONES UR QL STRIP: NEGATIVE
LEUKOCYTE ESTERASE UR QL STRIP: NEGATIVE
Lab: NORMAL
MICRO URNS: NORMAL
MICRO URNS: NORMAL
NITRITE UR QL STRIP: NEGATIVE
PH UR STRIP: 7.5 [PH] (ref 5–7.5)
PROT UR QL STRIP: NEGATIVE
RBC #/AREA URNS HPF: NORMAL /HPF (ref 0–2)
SP GR UR: 1 (ref 1–1.03)
UROBILINOGEN UR STRIP-ACNC: 0.2 MG/DL (ref 0.2–1)
WBC #/AREA URNS HPF: NORMAL /HPF (ref 0–5)

## 2023-09-05 ENCOUNTER — TELEPHONE (OUTPATIENT)
Dept: BARIATRICS | Facility: CLINIC | Age: 33
End: 2023-09-05

## 2023-09-05 NOTE — TELEPHONE ENCOUNTER
Spoke with patient. She forgot to do the renewal on her insurance. She is doing the renewal today and calling her cawe worker to speed the process up, so that it's reactivates right away. ...she will call to advise once she gets to talk to the .

## 2023-10-10 ENCOUNTER — APPOINTMENT (OUTPATIENT)
Dept: LAB | Facility: HOSPITAL | Age: 33
End: 2023-10-10
Payer: COMMERCIAL

## 2023-10-10 DIAGNOSIS — R29.898 OTHER SYMPTOMS AND SIGNS INVOLVING THE MUSCULOSKELETAL SYSTEM: ICD-10-CM

## 2023-10-10 DIAGNOSIS — R20.2 PARESTHESIA OF SKIN: ICD-10-CM

## 2023-10-10 DIAGNOSIS — R20.2 FORMICATION: ICD-10-CM

## 2023-10-10 DIAGNOSIS — R29.898 HAND PROBLEMS: ICD-10-CM

## 2023-10-10 LAB
ALBUMIN SERPL BCP-MCNC: 4.4 G/DL (ref 3.5–5)
ALP SERPL-CCNC: 62 U/L (ref 34–104)
ALT SERPL W P-5'-P-CCNC: 16 U/L (ref 7–52)
ANA SER QL IA: NEGATIVE
ANION GAP SERPL CALCULATED.3IONS-SCNC: 5 MMOL/L
AST SERPL W P-5'-P-CCNC: 10 U/L (ref 13–39)
B BURGDOR IGG+IGM SER QL IA: NEGATIVE
BASOPHILS # BLD AUTO: 0.04 THOUSANDS/ÂΜL (ref 0–0.1)
BASOPHILS NFR BLD AUTO: 1 % (ref 0–1)
BILIRUB SERPL-MCNC: 0.49 MG/DL (ref 0.2–1)
BUN SERPL-MCNC: 10 MG/DL (ref 5–25)
CALCIUM SERPL-MCNC: 9.2 MG/DL (ref 8.4–10.2)
CHLORIDE SERPL-SCNC: 106 MMOL/L (ref 96–108)
CHOLEST SERPL-MCNC: 151 MG/DL
CO2 SERPL-SCNC: 26 MMOL/L (ref 21–32)
CREAT SERPL-MCNC: 0.68 MG/DL (ref 0.6–1.3)
EOSINOPHIL # BLD AUTO: 0.3 THOUSAND/ÂΜL (ref 0–0.61)
EOSINOPHIL NFR BLD AUTO: 5 % (ref 0–6)
ERYTHROCYTE [DISTWIDTH] IN BLOOD BY AUTOMATED COUNT: 13.5 % (ref 11.6–15.1)
ERYTHROCYTE [SEDIMENTATION RATE] IN BLOOD: 15 MM/HOUR (ref 0–19)
FERRITIN SERPL-MCNC: 26 NG/ML (ref 11–307)
GFR SERPL CREATININE-BSD FRML MDRD: 115 ML/MIN/1.73SQ M
GLUCOSE P FAST SERPL-MCNC: 93 MG/DL (ref 65–99)
HCT VFR BLD AUTO: 37.8 % (ref 34.8–46.1)
HDLC SERPL-MCNC: 37 MG/DL
HGB BLD-MCNC: 12.4 G/DL (ref 11.5–15.4)
IMM GRANULOCYTES # BLD AUTO: 0.03 THOUSAND/UL (ref 0–0.2)
IMM GRANULOCYTES NFR BLD AUTO: 1 % (ref 0–2)
IRON SATN MFR SERPL: 16 % (ref 15–50)
IRON SERPL-MCNC: 56 UG/DL (ref 50–212)
LDLC SERPL CALC-MCNC: 93 MG/DL (ref 0–100)
LYMPHOCYTES # BLD AUTO: 1.85 THOUSANDS/ÂΜL (ref 0.6–4.47)
LYMPHOCYTES NFR BLD AUTO: 30 % (ref 14–44)
MCH RBC QN AUTO: 28.6 PG (ref 26.8–34.3)
MCHC RBC AUTO-ENTMCNC: 32.8 G/DL (ref 31.4–37.4)
MCV RBC AUTO: 87 FL (ref 82–98)
MONOCYTES # BLD AUTO: 0.36 THOUSAND/ÂΜL (ref 0.17–1.22)
MONOCYTES NFR BLD AUTO: 6 % (ref 4–12)
NEUTROPHILS # BLD AUTO: 3.56 THOUSANDS/ÂΜL (ref 1.85–7.62)
NEUTS SEG NFR BLD AUTO: 57 % (ref 43–75)
NONHDLC SERPL-MCNC: 114 MG/DL
NRBC BLD AUTO-RTO: 0 /100 WBCS
PLATELET # BLD AUTO: 198 THOUSANDS/UL (ref 149–390)
PMV BLD AUTO: 10.2 FL (ref 8.9–12.7)
POTASSIUM SERPL-SCNC: 3.9 MMOL/L (ref 3.5–5.3)
PROT SERPL-MCNC: 7.8 G/DL (ref 6.4–8.4)
RBC # BLD AUTO: 4.33 MILLION/UL (ref 3.81–5.12)
SODIUM SERPL-SCNC: 137 MMOL/L (ref 135–147)
TIBC SERPL-MCNC: 360 UG/DL (ref 250–450)
TRIGL SERPL-MCNC: 104 MG/DL
TSH SERPL DL<=0.05 MIU/L-ACNC: 1.92 UIU/ML (ref 0.45–4.5)
UIBC SERPL-MCNC: 304 UG/DL (ref 155–355)
VIT B12 SERPL-MCNC: 586 PG/ML (ref 180–914)
WBC # BLD AUTO: 6.14 THOUSAND/UL (ref 4.31–10.16)

## 2023-10-10 PROCEDURE — 80061 LIPID PANEL: CPT

## 2023-10-10 PROCEDURE — 84425 ASSAY OF VITAMIN B-1: CPT

## 2023-10-10 PROCEDURE — 82607 VITAMIN B-12: CPT

## 2023-10-10 PROCEDURE — 82728 ASSAY OF FERRITIN: CPT

## 2023-10-10 PROCEDURE — 86038 ANTINUCLEAR ANTIBODIES: CPT

## 2023-10-10 PROCEDURE — 85652 RBC SED RATE AUTOMATED: CPT

## 2023-10-10 PROCEDURE — 36415 COLL VENOUS BLD VENIPUNCTURE: CPT

## 2023-10-10 PROCEDURE — 83540 ASSAY OF IRON: CPT

## 2023-10-10 PROCEDURE — 84443 ASSAY THYROID STIM HORMONE: CPT

## 2023-10-10 PROCEDURE — 83550 IRON BINDING TEST: CPT

## 2023-10-10 PROCEDURE — 85025 COMPLETE CBC W/AUTO DIFF WBC: CPT

## 2023-10-10 PROCEDURE — 80053 COMPREHEN METABOLIC PANEL: CPT

## 2023-10-10 PROCEDURE — 86618 LYME DISEASE ANTIBODY: CPT

## 2023-10-14 LAB — VIT B1 BLD-SCNC: 124.2 NMOL/L (ref 66.5–200)

## 2023-10-18 ENCOUNTER — TELEPHONE (OUTPATIENT)
Dept: OBGYN CLINIC | Facility: CLINIC | Age: 33
End: 2023-10-18

## 2023-10-18 DIAGNOSIS — O09.10 PREGNANCY WITH HISTORY OF ECTOPIC PREGNANCY, ANTEPARTUM: Primary | ICD-10-CM

## 2023-10-18 NOTE — TELEPHONE ENCOUNTER
Left this message on Jaimee's voice mail about doing HCG and repeat in 3-4 days. OK to schedule office visit for when she would be 6 weeks by LMP. Will change this if the HCG is not trending appropriately. HCG orders transmitted to [facility] [employer]..

## 2023-10-18 NOTE — TELEPHONE ENCOUNTER
Please order HCG and repeat in 3-4 days. OK to schedule office visit for when she would be 6 weeks by LMP.  Will change this if the HCG is not trending appropriately

## 2023-10-18 NOTE — TELEPHONE ENCOUNTER
Jaimee received  (+) HPT results this morning. LMP:09/21/23;approx. 4 weeks. Bldtype: AB(-). Denies any cramping pains or vaginal bleeding. Jaimee did have an Ectopic pregnancy in October 2022;miscarriage in 03/2023; & a full term pregnancy 10/08/2016. Due to PMHx of Ectopic is why she is requesting to get HCG done now and U/S testing when she is 6 weeks. Message sent to Dr. Bakari Huynh for any recommendations.

## 2023-10-19 ENCOUNTER — APPOINTMENT (OUTPATIENT)
Dept: LAB | Facility: HOSPITAL | Age: 33
End: 2023-10-19
Payer: COMMERCIAL

## 2023-10-19 DIAGNOSIS — O09.10 PRIOR ECTOPIC PREGNANCY, ANTEPARTUM: ICD-10-CM

## 2023-10-19 LAB — B-HCG SERPL-ACNC: 254 MIU/ML (ref 0–5)

## 2023-10-19 PROCEDURE — 36415 COLL VENOUS BLD VENIPUNCTURE: CPT

## 2023-10-19 PROCEDURE — 84702 CHORIONIC GONADOTROPIN TEST: CPT

## 2023-10-23 ENCOUNTER — APPOINTMENT (OUTPATIENT)
Dept: LAB | Facility: HOSPITAL | Age: 33
End: 2023-10-23
Payer: COMMERCIAL

## 2023-10-23 DIAGNOSIS — O09.10 PRIOR ECTOPIC PREGNANCY, ANTEPARTUM: ICD-10-CM

## 2023-10-23 LAB — B-HCG SERPL-ACNC: 1983 MIU/ML (ref 0–5)

## 2023-10-23 PROCEDURE — 36415 COLL VENOUS BLD VENIPUNCTURE: CPT

## 2023-10-23 PROCEDURE — 84702 CHORIONIC GONADOTROPIN TEST: CPT

## 2023-10-25 ENCOUNTER — OFFICE VISIT (OUTPATIENT)
Dept: OBGYN CLINIC | Facility: CLINIC | Age: 33
End: 2023-10-25

## 2023-10-25 VITALS
SYSTOLIC BLOOD PRESSURE: 128 MMHG | BODY MASS INDEX: 33.97 KG/M2 | WEIGHT: 211.4 LBS | DIASTOLIC BLOOD PRESSURE: 78 MMHG | HEIGHT: 66 IN

## 2023-10-25 DIAGNOSIS — Z87.59 HISTORY OF ECTOPIC PREGNANCY: ICD-10-CM

## 2023-10-25 DIAGNOSIS — O36.80X0 PREGNANCY OF UNKNOWN ANATOMIC LOCATION: Primary | ICD-10-CM

## 2023-10-25 RX ORDER — UBIDECARENONE 75 MG
CAPSULE ORAL DAILY
COMMUNITY

## 2023-10-25 RX ORDER — ASPIRIN 81 MG/1
81 TABLET ORAL
COMMUNITY
Start: 2023-06-01 | End: 2023-11-21 | Stop reason: ALTCHOICE

## 2023-11-12 PROBLEM — O36.80X0 PREGNANCY OF UNKNOWN ANATOMIC LOCATION: Status: ACTIVE | Noted: 2023-10-25

## 2023-11-12 PROBLEM — Z87.59 HISTORY OF ECTOPIC PREGNANCY: Status: ACTIVE | Noted: 2022-10-01

## 2023-11-12 NOTE — ASSESSMENT & PLAN NOTE
Pt with a history of ectopic pregnancy in 2022 and is here to confirm an IUP. LMP is 9/18/23. She has no bleeding or pain. HCG on 10/19 was 254 and 10/23 is 1983. These levels are consistent with her LMP. Today a TV U/S shows an intrauterine gestational sac with yolk sac. No fetal pole is noted, which is consistent with this early gestation. No adnexal masses are noted. Reviewed with patient. She understands that this does not guarantee viability but an ectopic pregnancy has been ruled out. She has a 1st PN visit scheduled.

## 2023-11-12 NOTE — PROGRESS NOTES
Assessment/Plan:    Pregnancy of unknown anatomic location  Pt with a history of ectopic pregnancy in 2022 and is here to confirm an IUP. LMP is 9/18/23. She has no bleeding or pain. HCG on 10/19 was 254 and 10/23 is 1983. These levels are consistent with her LMP. Today a TV U/S shows an intrauterine gestational sac with yolk sac. No fetal pole is noted, which is consistent with this early gestation. No adnexal masses are noted. Reviewed with patient. She understands that this does not guarantee viability but an ectopic pregnancy has been ruled out. She has a 1st PN visit scheduled. Diagnoses and all orders for this visit:    Pregnancy of unknown anatomic location    Other orders  -     aspirin (Aspirin 81) 81 mg EC tablet; 81 mg Daily at 2am  -     cyanocobalamin (VITAMIN B-12) 100 mcg tablet; Take by mouth daily          Subjective:      Patient ID: Jason Bradford is a 28 y.o. female.     HPI    The following portions of the patient's history were reviewed and updated as appropriate: allergies, current medications, past family history, past medical history, past social history, past surgical history, and problem list.    Review of Systems      Objective:      /78   Ht 5' 6" (1.676 m)   Wt 95.9 kg (211 lb 6.4 oz)   LMP 09/21/2023   Breastfeeding No   BMI 34.12 kg/m²

## 2023-11-16 ENCOUNTER — TELEMEDICINE (OUTPATIENT)
Dept: OBGYN CLINIC | Facility: CLINIC | Age: 33
End: 2023-11-16

## 2023-11-16 VITALS — BODY MASS INDEX: 33.91 KG/M2 | HEIGHT: 66 IN | WEIGHT: 211 LBS

## 2023-11-16 DIAGNOSIS — Z36.89 ENCOUNTER FOR OTHER SPECIFIED ANTENATAL SCREENING: Primary | ICD-10-CM

## 2023-11-16 NOTE — PROGRESS NOTES
The patient was identified by name and date of birth. Jaimee was informed that this is a telemedicine nurse OB intake visit and that the visit is being conducted through [facility] Now and patient was informed that this is a secure, HIPAA-compliant platform. She agrees to proceed. My office door was closed. No one else was in the room. She acknowledged consent and understanding of privacy and security of the video platform. The patient has agreed to participate and understands they can discontinue the visit at any time. OB INTAKE INTERVIEW  Patient is 28 y. o.who presents for OB intake at 8w0d  The father of her baby is her  Beto. This is a planned pregnancy. Pt is feeling anxious due to history of SAB and ectopic.   Last Menstrual Period: 2023 8w0d IRISH 2024 based on LMP  Ultrasound: scheduled for 2023 with Roxana. Pt had US on 10/25/2023 for anatomic location due to history of ectopic. Estimated Date of Delivery: TBD with 2023 appointment with Roxana    Signs/Symptoms of Pregnancy  Current pregnancy symptoms: fatigue, breast tenderness, hunger, slight nausea- advised 5-6 smaller meals and avoiding empty belly,   Constipation no  Headaches no  Cramping/spotting YES-occasional mild, denies VB  PICA cravings no    Diabetes-  There is no height or weight on file to calculate BMI.   If patient has 1 or more, please order early 1 hour GTT  History of GDM no  BMI >30 YES  History of PCOS or current metformin use no  History of LGA/macrosomic infant (4000g/9lbs) YES- 4026 gm    If patient has 2 or more, please order early 1 hour GTT  AMA no  First degree relative with type 2 diabetes YES-father  History of chronic HTN, hyperlipidemia, elevated A1C no  High risk race (, , ,  or [place] Islander) no    Hypertension- if you answer yes, please order preeclampsia labs (cbc, comprehensive metabolic panel, urine protein creatinine ratio, uric acid)  History of of chronic HTN no  History of gestational HTN no  History of preeclampsia, eclampsia, or HELLP syndrome no  History of diabetes no  History of lupus, autoimmune disease, kidney disease, antiphospholipid syndrome, rheumatoid arthritis, sjogren's syndrome no    Thyroid- if yes order TSH with reflex T4 (Unless TSH value on file within last 4 weeks then do not need to order)  History of thyroid disease no    Bleeding Disorder or Hx of DVT-patient or first degree relative with history of. Order the following if not done previously. (Factor V, antithrombin III, prothrombin gene mutation, protein C and S Ag, lupus anticoagulant, anticardiolipin, beta-2 glycoprotein)   no    OB/GYN-  History of abnormal pap smear no  History of HPV no  History of Herpes/HSV no  History of other STI (gonorrhea, chlamydia, trich) (or current partner with Hep B, Hep C, or HIV) no  History of prior  no  History of prior  YES--interested in TOLAC  History of  delivery prior to 36 weeks 6 days no  History of blood transfusion no  Ok for blood transfusion YES    Substance screening-   History of tobacco use YES-quit   Currently using tobacco no  Currently using alcohol no  Presently using drugs no  Past drug use (if yes, order urine drug screening panel)  no  IV drug use (if yes, order urine drug screening panel) no  Partner drug use (if yes, order urine drug screening panel) no  Parent/Family drug use (do not order urine drug screening panel just for family hx) no    Depression Screening-  PHQ-9 Score: 0    MRSA Screening-   Does the pt have a hx of MRSA? no  If yes- please follow MRSA protocol and obtain a nasal swab for MRSA culture at 12 week visit.     Immunizations:  Influenza vaccine given this season YES-end   Discussed Tdap vaccine YES  Discussed COVID Vaccine YES x3    Genetic/MFM-  Do you or your partner have a history of any of the following in yourselves or first degree relatives? Cystic fibrosis no  Spinal muscular atrophy no  Hemoglobinopathy/Sickle Cell/Thalassemia no  Fragile X Intellectual Disability no    If yes, discuss carrier screening and recommend consultation with [facility]/genetic counseling. If no, discuss option for carrier screening and/or genetic testing with Nuchal Ultrasound. Patient interested YES  Appointment at [facility] made NO-to be provided at 2023 appointment-pended    Interview education  [facility] Pregnancy Essentials Book reviewed and discussed YES      Prenatal lab work scripts NO-to be provided at 2023 appointment-pended    Extra labs ordered:early 1 hr gtt for BMI/hx LGA infant, interested in CF/SMA-preauth sent to [address]        Details that I feel the provider should be aware of: , planned pregnancy, feeling anxious due to previous SAB and ectopic.  Regulo Skinner. Hx previous C/S, planning TOLAC, possibly interested in . Had US done 10/25/2023 for anatomic location. Pt planning a vacation to [place] after Thanksgiving, they are driving there. I advised frequent stops so she could walk and stretch. Pt inquired if she should wear compression stockings on the ride? Last PAP done in  at [facility], Needs PAP/G/C. All prenatal labs including an early 1 hr gtt and referrals are pended. Pt interested in CF/SMA. Visit was done virtually. Please provide patient with scripts and blue folder at appointment on 2023      The patient was oriented to our practice, reviewed delivering physicians and [address] [place] [address] in [facility] for Delivery. All questions were answered.     Interviewed by: Michelle Erwin RN

## 2023-11-16 NOTE — PATIENT INSTRUCTIONS
Congratulations!! Please review our Pregnancy Essential Guide for informative education and here is a link to take a [facility] tour of our [address] [facility] Pregnancy Essentials Guide  [facility] Women's Health ([email or web address])       [facility] Tour on [facility]!! Please review our Pregnancy Essential Guide for informative education and here is a link to take a irtual tour of our [facility] women & [employer].      [facility] Pregnancy Essentials Guide  . [facility] Women's Health ([email or web address])       [facility] Women and [address] on [employer]

## 2023-11-21 ENCOUNTER — INITIAL PRENATAL (OUTPATIENT)
Dept: OBGYN CLINIC | Facility: CLINIC | Age: 33
End: 2023-11-21
Payer: COMMERCIAL

## 2023-11-21 ENCOUNTER — TELEPHONE (OUTPATIENT)
Facility: HOSPITAL | Age: 33
End: 2023-11-21

## 2023-11-21 VITALS — WEIGHT: 214.6 LBS | DIASTOLIC BLOOD PRESSURE: 74 MMHG | BODY MASS INDEX: 34.64 KG/M2 | SYSTOLIC BLOOD PRESSURE: 116 MMHG

## 2023-11-21 DIAGNOSIS — O99.210 OBESITY IN PREGNANCY: ICD-10-CM

## 2023-11-21 DIAGNOSIS — O99.211 OBESITY AFFECTING PREGNANCY IN FIRST TRIMESTER, UNSPECIFIED OBESITY TYPE: ICD-10-CM

## 2023-11-21 DIAGNOSIS — Z11.3 SCREENING EXAMINATION FOR VENEREAL DISEASE: ICD-10-CM

## 2023-11-21 DIAGNOSIS — Z12.4 SCREENING FOR CERVICAL CANCER: ICD-10-CM

## 2023-11-21 DIAGNOSIS — Z11.51 SCREENING FOR HPV (HUMAN PAPILLOMAVIRUS): ICD-10-CM

## 2023-11-21 DIAGNOSIS — Z36.89 ENCOUNTER FOR OTHER SPECIFIED ANTENATAL SCREENING: ICD-10-CM

## 2023-11-21 DIAGNOSIS — Z3A.08 8 WEEKS GESTATION OF PREGNANCY: ICD-10-CM

## 2023-11-21 DIAGNOSIS — O34.219 UTERINE SCAR FROM PREVIOUS CESAREAN DELIVERY AFFECTING PREGNANCY: Primary | ICD-10-CM

## 2023-11-21 DIAGNOSIS — Z13.71 SCREENING FOR GENETIC DISEASE CARRIER STATUS: ICD-10-CM

## 2023-11-21 LAB
SL AMB  POCT GLUCOSE, UA: NORMAL
SL AMB POCT URINE PROTEIN: NORMAL

## 2023-11-21 PROCEDURE — G0476 HPV COMBO ASSAY CA SCREEN: HCPCS | Performed by: PHYSICIAN ASSISTANT

## 2023-11-21 PROCEDURE — 87591 N.GONORRHOEAE DNA AMP PROB: CPT | Performed by: PHYSICIAN ASSISTANT

## 2023-11-21 PROCEDURE — G0145 SCR C/V CYTO,THINLAYER,RESCR: HCPCS | Performed by: PHYSICIAN ASSISTANT

## 2023-11-21 PROCEDURE — 81002 URINALYSIS NONAUTO W/O SCOPE: CPT | Performed by: PHYSICIAN ASSISTANT

## 2023-11-21 PROCEDURE — PNV: Performed by: PHYSICIAN ASSISTANT

## 2023-11-21 PROCEDURE — 87491 CHLMYD TRACH DNA AMP PROBE: CPT | Performed by: PHYSICIAN ASSISTANT

## 2023-11-21 NOTE — ASSESSMENT & PLAN NOTE
S=D, will keep EDC based on LMP. Pap/STD cultures done. Initial prenatal labs given including carrier testing and 1hr GTT. Reviewed sequential screen vs NIPT. Reviewed AFP to evaluate for ONTD, will provide script at future visit. Information for MFM given to schedule 12 week ultrasound. Return to office for ob check in 4 weeks.

## 2023-11-21 NOTE — PROGRESS NOTES
Routine Prenatal Visit  [facility], [address]    Assessment/Plan:  Evaristo Morales is a 28y.o. year old  at 11w11d who presents for routine prenatal visit. 1. Uterine scar from previous  delivery affecting pregnancy  Assessment & Plan:  Considering . 2. Encounter for other specified  screening  -     Ambulatory Referral to Maternal Fetal Medicine; Future  -     Ambulatory referral to social work care management program; Future  -     RPR-Syphilis Screening (Total Syphilis IGG/IGM); Future  -     Cystic fibrosis gene test; Future  -     CBC; Future  -     Glucose, 1H PG; Future  -     Chlamydia/GC amplified DNA by PCR  -     Hepatitis B surface antigen; Future  -     Hepatitis C antibody; Future  -     HIV 1/2 AG/AB w Reflex [facility] for 2 yr old and above; Future  -     Rubella antibody, IgG; Future  -     Type and screen; Future  -     Spinal muscular atrophy DNA; Future  -     Urinalysis with microscopic; Future  -     Urine culture; Future  -     Varicella zoster antibody, IgG; Future    3. 8 weeks gestation of pregnancy  Assessment & Plan:  S=D, will keep EDC based on LMP. Pap/STD cultures done. Initial prenatal labs given including carrier testing and 1hr GTT. Reviewed sequential screen vs NIPT. Reviewed AFP to evaluate for ONTD, will provide script at future visit. Information for MFM given to schedule 12 week ultrasound. Return to office for ob check in 4 weeks. Orders:  -     Ambulatory Referral to Maternal Fetal Medicine; Future  -     Ambulatory referral to social work care management program; Future  -     RPR-Syphilis Screening (Total Syphilis IGG/IGM); Future  -     POCT urine dip  -     Cystic fibrosis gene test; Future  -     Spinal muscular atrophy DNA; Future  -     AMB US OB < 14 weeks single or first gestation level 1    4.  Screening for genetic disease carrier status  -     Cystic fibrosis gene test; Future  - Spinal muscular atrophy DNA; Future    5. Obesity affecting pregnancy in first trimester, unspecified obesity type    6. Screening examination for venereal disease    7. Screening for cervical cancer  -     Liquid-based pap, screening    8. Screening for HPV (human papillomavirus)  -     Liquid-based pap, screening    9. Obesity in pregnancy    10. BMI 34.0-34.9,adult        Next OB Visit 4 weeks. Subjective:     CC: Prenatal care    Landon Cifuentes is a 28 y.o.  female who presents for routine prenatal care at [address] [place] [address]. Pregnancy ROS: No FM, N/V, HA, cramping, VB, LOF, edema, domestic violence, or smoking. Tolerating PNV. The following portions of the patient's history were reviewed and updated as appropriate: allergies, current medications, past family history, past medical history, obstetric history, gynecologic history, past social history, past surgical history and problem list.      Objective:  /74   Wt 97.3 kg (214 lb 9.6 oz)   LMP 2023 (Exact Date)   BMI 34.64 kg/m²   Pregravid Weight/BMI: 95.3 kg (210 lb) (BMI 33.91)  Current Weight: 97.3 kg (214 lb 9.6 oz)   Total Weight Gain: 2.087 kg (4 lb 9.6 oz)   Pre-Gabe Vitals      Flowsheet Row Most Recent Value   Prenatal Assessment    Fetal Heart Rate 180   Fundal Height (cm) 9 cm   Movement Absent   Prenatal Vitals    Blood Pressure 116/74   Weight - Scale 97.3 kg (214 lb 9.6 oz)   Urine Albumin/Glucose    Dilation/Effacement/Station    Vaginal Drainage    Edema    LLE Edema None   RLE Edema None   Facial Edema None             General: Well appearing, no distress  Abdomen: Soft, gravid, nontender  Fundal Height: Appropriate for gestational age. Extremities: Warm and well perfused. Non tender. FIRST TRIMESTER OBSTETRIC ULTRASOUND  Date of study: 2023  Performed by: Maureen Velasquez PA-C     INDICATION: Amenorrhea, viability    COMPARISON: None.      TECHNIQUE:   Transvaginal imaging was performed to assess the gestation, myometrial/endometrial architecture and ovarian parenchymal detail. The study includes volumetric sweeps and traditional still imaging technique. FINDINGS:     A single intrauterine gestation is identified. Cardiac activity is detected at 180. YOLK SAC:  Present and normal in size and appearance. MEAN CROWN RUMP LENGTH:  23.3 mm = 9 weeks 0 days   AMNIOTIC FLUID/SAC SHAPE:  Within expected normal range. UTERUS/ADNEXA:   No adnexal mass or pathologic cyst.  No free fluid identified. IMPRESSION:    Will assign dating based on LMP (9/21/2023)  Final IRISH: 6/27/2024  Gestational age: 11w11d  Fetal cardiac activity detected. No adnexal masses seen. Zenobia Trujillo PA-C  11/21/2023 1:02 PM       Additional Findings: none    FHR: 80    Assigning a Final IRISH  Please choose how you are assigning the IRISH: The gestational age by LMP is </= 8w 6d and demonstrates 5 or fewer days difference from the gestational age by [facility], therefore the final IRISH will be based on the LMP    Final IRISH: 6/27/2024 by LMP.         [facility] [address] [facility]  [address] [id number]  Dept: [phone]  Dept Fax: [phone]  Loc Appt: [phone]  Loc: [facility] Fax: [phone]

## 2023-11-21 NOTE — TELEPHONE ENCOUNTER
Called patient to schedule MFM appointment, based on referral issued to Maternal Fetal Medicine by [facility] office. Left voicemail requesting patient to call back and schedule appointment, with office number for return call [phone].

## 2023-11-22 ENCOUNTER — PATIENT OUTREACH (OUTPATIENT)
Dept: OBGYN CLINIC | Facility: CLINIC | Age: 33
End: 2023-11-22

## 2023-11-22 NOTE — PROGRESS NOTES
SW referred for initial prenatal assessment. Patient Francisco Hu with an IRISH of 6/27/24. SW called patient who was agreeable to completing assessment. Patient reports she and FOB ( Rosales Ly) are very excited for this pregnancy. She and FOB live with their 10 y/o son Franck Willis in [place]. Patient is an  with Cory Benitez, FOB works in [employer] for [employer]. They both drive. Patient denies current financial concerns or need for MA/WIC/SNAP information, parenting education, or baby supply resources. Patient denies current or h/o mental health, substance use, DV/IPV, CYS involvement, and legal concerns. She reports good support system with FOB, family and friends. She enjoys listening to podcasts and spending time with her son for stress relief. No other needs identified at this time, SW closing referral. Please re-refer as needed.

## 2023-11-23 LAB
C TRACH DNA SPEC QL NAA+PROBE: NEGATIVE
HPV HR 12 DNA CVX QL NAA+PROBE: NEGATIVE
HPV16 DNA CVX QL NAA+PROBE: NEGATIVE
HPV18 DNA CVX QL NAA+PROBE: NEGATIVE
N GONORRHOEA DNA SPEC QL NAA+PROBE: NEGATIVE

## 2023-11-30 LAB
LAB AP GYN PRIMARY INTERPRETATION: NORMAL
LAB AP LMP: NORMAL
Lab: NORMAL
PATH INTERP SPEC-IMP: NORMAL

## 2023-12-01 ENCOUNTER — TELEPHONE (OUTPATIENT)
Dept: OBGYN CLINIC | Facility: CLINIC | Age: 33
End: 2023-12-01

## 2023-12-01 DIAGNOSIS — N76.0 BACTERIAL VAGINOSIS: Primary | ICD-10-CM

## 2023-12-01 DIAGNOSIS — B96.89 BACTERIAL VAGINOSIS: Primary | ICD-10-CM

## 2023-12-01 RX ORDER — METRONIDAZOLE 500 MG/1
500 TABLET ORAL EVERY 12 HOURS SCHEDULED
Qty: 14 TABLET | Refills: 0 | Status: SHIPPED | OUTPATIENT
Start: 2023-12-01 | End: 2023-12-08

## 2023-12-14 ENCOUNTER — LAB (OUTPATIENT)
Dept: LAB | Facility: HOSPITAL | Age: 33
End: 2023-12-14
Payer: COMMERCIAL

## 2023-12-14 ENCOUNTER — TELEPHONE (OUTPATIENT)
Dept: OBGYN CLINIC | Facility: CLINIC | Age: 33
End: 2023-12-14

## 2023-12-14 DIAGNOSIS — Z3A.08 8 WEEKS GESTATION OF PREGNANCY: ICD-10-CM

## 2023-12-14 DIAGNOSIS — Z36.89 ENCOUNTER FOR OTHER SPECIFIED ANTENATAL SCREENING: ICD-10-CM

## 2023-12-14 LAB
ABO GROUP BLD: NORMAL
BACTERIA UR QL AUTO: ABNORMAL /HPF
BILIRUB UR QL STRIP: NEGATIVE
BLD GP AB SCN SERPL QL: NEGATIVE
CLARITY UR: CLEAR
COLOR UR: YELLOW
ERYTHROCYTE [DISTWIDTH] IN BLOOD BY AUTOMATED COUNT: 14.3 % (ref 11.6–15.1)
GLUCOSE 1H P 50 G GLC PO SERPL-MCNC: 124 MG/DL (ref 40–134)
GLUCOSE UR STRIP-MCNC: NEGATIVE MG/DL
HBV SURFACE AG SER QL: NORMAL
HCT VFR BLD AUTO: 34.9 % (ref 34.8–46.1)
HCV AB SER QL: NORMAL
HGB BLD-MCNC: 11.6 G/DL (ref 11.5–15.4)
HGB UR QL STRIP.AUTO: NEGATIVE
HIV 1+2 AB+HIV1 P24 AG SERPL QL IA: NORMAL
HIV 2 AB SERPL QL IA: NORMAL
HIV1 AB SERPL QL IA: NORMAL
HIV1 P24 AG SERPL QL IA: NORMAL
KETONES UR STRIP-MCNC: NEGATIVE MG/DL
LEUKOCYTE ESTERASE UR QL STRIP: NEGATIVE
MCH RBC QN AUTO: 28.9 PG (ref 26.8–34.3)
MCHC RBC AUTO-ENTMCNC: 33.2 G/DL (ref 31.4–37.4)
MCV RBC AUTO: 87 FL (ref 82–98)
NITRITE UR QL STRIP: NEGATIVE
NON-SQ EPI CELLS URNS QL MICRO: ABNORMAL /HPF
PH UR STRIP.AUTO: 6.5 [PH]
PLATELET # BLD AUTO: 148 THOUSANDS/UL (ref 149–390)
PMV BLD AUTO: 9.9 FL (ref 8.9–12.7)
PROT UR STRIP-MCNC: NEGATIVE MG/DL
RBC # BLD AUTO: 4.01 MILLION/UL (ref 3.81–5.12)
RBC #/AREA URNS AUTO: ABNORMAL /HPF
RH BLD: NEGATIVE
RUBV IGG SERPL IA-ACNC: 32.7 IU/ML
SP GR UR STRIP.AUTO: 1.02 (ref 1–1.03)
SPECIMEN EXPIRATION DATE: NORMAL
TREPONEMA PALLIDUM IGG+IGM AB [PRESENCE] IN SERUM OR PLASMA BY IMMUNOASSAY: NORMAL
UROBILINOGEN UR STRIP-ACNC: <2 MG/DL
VZV IGG SER QL IA: NORMAL
WBC # BLD AUTO: 6.46 THOUSAND/UL (ref 4.31–10.16)
WBC #/AREA URNS AUTO: ABNORMAL /HPF

## 2023-12-14 PROCEDURE — 87086 URINE CULTURE/COLONY COUNT: CPT

## 2023-12-14 PROCEDURE — 81001 URINALYSIS AUTO W/SCOPE: CPT

## 2023-12-14 PROCEDURE — 36415 COLL VENOUS BLD VENIPUNCTURE: CPT

## 2023-12-14 PROCEDURE — 86803 HEPATITIS C AB TEST: CPT

## 2023-12-14 PROCEDURE — 86850 RBC ANTIBODY SCREEN: CPT

## 2023-12-14 PROCEDURE — 85027 COMPLETE CBC AUTOMATED: CPT

## 2023-12-14 PROCEDURE — 86900 BLOOD TYPING SEROLOGIC ABO: CPT

## 2023-12-14 PROCEDURE — 86762 RUBELLA ANTIBODY: CPT

## 2023-12-14 PROCEDURE — 87389 HIV-1 AG W/HIV-1&-2 AB AG IA: CPT

## 2023-12-14 PROCEDURE — 86780 TREPONEMA PALLIDUM: CPT

## 2023-12-14 PROCEDURE — 82950 GLUCOSE TEST: CPT

## 2023-12-14 PROCEDURE — 87340 HEPATITIS B SURFACE AG IA: CPT

## 2023-12-14 PROCEDURE — 86901 BLOOD TYPING SEROLOGIC RH(D): CPT

## 2023-12-14 PROCEDURE — 86787 VARICELLA-ZOSTER ANTIBODY: CPT

## 2023-12-14 NOTE — TELEPHONE ENCOUNTER
Jaimee was questioning,if genetic testing was approved by [employer]. Sp[facility] with Cheyenne Cruz, [facility] Whom states prior authorization is still pending and may take weeks to get response. Informed Jaimee of Eugenia's message. Recommended for Jaimee to get First PN lab testing done today but do not get genetic testing done today. Informed Jaimee about getting genetic testing done after receive insurance company response. Jaimee verbally understood & agreed with plan.

## 2023-12-16 LAB — BACTERIA UR CULT: NORMAL

## 2023-12-18 ENCOUNTER — APPOINTMENT (OUTPATIENT)
Dept: LAB | Facility: HOSPITAL | Age: 33
End: 2023-12-18
Payer: COMMERCIAL

## 2023-12-18 ENCOUNTER — ROUTINE PRENATAL (OUTPATIENT)
Dept: OBGYN CLINIC | Facility: CLINIC | Age: 33
End: 2023-12-18
Payer: COMMERCIAL

## 2023-12-18 ENCOUNTER — TELEPHONE (OUTPATIENT)
Dept: OBGYN CLINIC | Facility: CLINIC | Age: 33
End: 2023-12-18

## 2023-12-18 VITALS
SYSTOLIC BLOOD PRESSURE: 116 MMHG | DIASTOLIC BLOOD PRESSURE: 68 MMHG | BODY MASS INDEX: 34.39 KG/M2 | HEIGHT: 66 IN | WEIGHT: 214 LBS

## 2023-12-18 DIAGNOSIS — Z36.89 ENCOUNTER FOR OTHER SPECIFIED ANTENATAL SCREENING: ICD-10-CM

## 2023-12-18 DIAGNOSIS — Z3A.08 8 WEEKS GESTATION OF PREGNANCY: ICD-10-CM

## 2023-12-18 DIAGNOSIS — O99.211 OBESITY AFFECTING PREGNANCY IN FIRST TRIMESTER, UNSPECIFIED OBESITY TYPE: ICD-10-CM

## 2023-12-18 DIAGNOSIS — O99.119 THROMBOCYTOPENIA AFFECTING PREGNANCY (HCC): Primary | ICD-10-CM

## 2023-12-18 DIAGNOSIS — D69.6 THROMBOCYTOPENIA AFFECTING PREGNANCY (HCC): Primary | ICD-10-CM

## 2023-12-18 DIAGNOSIS — O34.211 MATERNAL CARE DUE TO LOW TRANSVERSE UTERINE SCAR FROM PREVIOUS CESAREAN DELIVERY: ICD-10-CM

## 2023-12-18 DIAGNOSIS — Z13.71 SCREENING FOR GENETIC DISEASE CARRIER STATUS: ICD-10-CM

## 2023-12-18 DIAGNOSIS — Z67.91 RH NEGATIVE STATUS DURING PREGNANCY IN FIRST TRIMESTER: ICD-10-CM

## 2023-12-18 DIAGNOSIS — O26.891 RH NEGATIVE STATUS DURING PREGNANCY IN FIRST TRIMESTER: ICD-10-CM

## 2023-12-18 DIAGNOSIS — Z3A.12 12 WEEKS GESTATION OF PREGNANCY: ICD-10-CM

## 2023-12-18 PROBLEM — O99.210 OBESITY AFFECTING PREGNANCY: Status: ACTIVE | Noted: 2023-12-18

## 2023-12-18 PROBLEM — O26.899 RH NEGATIVE STATUS DURING PREGNANCY: Status: ACTIVE | Noted: 2023-12-18

## 2023-12-18 LAB
SL AMB  POCT GLUCOSE, UA: NORMAL
SL AMB POCT URINE PROTEIN: NORMAL

## 2023-12-18 PROCEDURE — PNV: Performed by: STUDENT IN AN ORGANIZED HEALTH CARE EDUCATION/TRAINING PROGRAM

## 2023-12-18 PROCEDURE — 81329 SMN1 GENE DOS/DELETION ALYS: CPT

## 2023-12-18 PROCEDURE — 81002 URINALYSIS NONAUTO W/O SCOPE: CPT | Performed by: STUDENT IN AN ORGANIZED HEALTH CARE EDUCATION/TRAINING PROGRAM

## 2023-12-18 PROCEDURE — 36415 COLL VENOUS BLD VENIPUNCTURE: CPT

## 2023-12-18 PROCEDURE — 81220 CFTR GENE COM VARIANTS: CPT

## 2023-12-18 NOTE — ASSESSMENT & PLAN NOTE
- Reviewed low platelets (148) noted on initial prenatal panel last week. Patient was coincidentally +COVID at time of lab draw.  - Recommend repeat CBC in 4 weeks. Order provided.

## 2023-12-18 NOTE — ASSESSMENT & PLAN NOTE
- Discussed mode of delivery with patient. She feels that sub-optimal pain control contributed to her need for  section with her first and she would like to TOLAC. Discussed risks/benefits of  vs repeat c/s, including increased morbidity associated with  section in labor compared to  section prior to onset of labor.

## 2023-12-18 NOTE — ASSESSMENT & PLAN NOTE
- Prenatal lab results reviewed.  - First trimester MFM consult is scheduled for this week. Patient plans for NIPS.  - Problem list updated, results console reviewed and updated with pertinent prenatal labs.  - PMH, PSH, medications reviewed and updated as needed.  - Return to office in 4 wk(s) for routine prenatal care.

## 2023-12-18 NOTE — TELEPHONE ENCOUNTER
Sonny cedeno/karen she was sick with cold symptoms end of last week. Tested + for covid on Saturday. Feels improved today, has not had fever. Asking if she can keep OB appt for today if she wears a mask.  (Per stephan ALEJO Ok for appt if fever free x 24 hours and improved. Wear mask to visit). Return call to lay [place] to keep appt today.

## 2023-12-18 NOTE — PROGRESS NOTES
"Routine Prenatal Visit  [facility] [place]  [address] [place] [address]    Assessment/Plan:  Jaimee is a 33 y.o. year old  at 12w4d who presents for routine prenatal visit.     1. Thrombocytopenia affecting pregnancy (HCC)  Assessment & Plan:  - Reviewed low platelets (148) noted on initial prenatal panel last week. Patient was coincidentally +COVID at time of lab draw.  - Recommend repeat CBC in 4 weeks. Order provided.    Orders:  -     CBC; Future; Expected date: 01/15/2024    2. 12 weeks gestation of pregnancy  Assessment & Plan:  - Prenatal lab results reviewed.  - First trimester MFM consult is scheduled for this week. Patient plans for NIPS.  - Problem list updated, results console reviewed and updated with pertinent prenatal labs.  - PMH, PSH, medications reviewed and updated as needed.  - Return to office in 4 wk(s) for routine prenatal care.      Orders:  -     POCT urine dip    3. Maternal care due to low transverse uterine scar from previous  delivery  Assessment & Plan:  - Discussed mode of delivery with patient. She feels that sub-optimal pain control contributed to her need for  section with her first and she would like to TOLAC. Discussed risks/benefits of  vs repeat c/s, including increased morbidity associated with  section in labor compared to  section prior to onset of labor.       4. Obesity affecting pregnancy in first trimester, unspecified obesity type    5. Rh negative status during pregnancy in first trimester          Subjective:   Jaimee Bowman is a 33 y.o.  who presents for routine prenatal care at 12w4d.  Complaints today: COVID last week. Feeling better.   LOF: No; VB: No; Contractions: No    Objective:  /68 (BP Location: Right arm, Patient Position: Sitting, Cuff Size: Standard)   Ht 5' 6\" (1.676 m)   Wt 97.1 kg (214 lb)   LMP 2023 (Exact Date)   BMI 34.54 kg/m²     General: Well appearing, no " distress  Respiratory: Unlabored breathing  Cardiovascular: Regular rate.  Abdomen: Soft, gravid, nontender  Extremities: Warm and well perfused.  Non tender.    Pregravid Weight/BMI: 95.3 kg (210 lb) (BMI 33.91)  Current Weight: 97.1 kg (214 lb)   Total Weight Gain: 1.814 kg (4 lb)     Pre-Gabe Vitals      Flowsheet Row Most Recent Value   Prenatal Assessment    Prenatal Vitals    Blood Pressure 116/68   Weight - Scale 97.1 kg (214 lb)   Urine Albumin/Glucose    Dilation/Effacement/Station    Vaginal Drainage    Edema              Jacobo Shirley MD  2023 10:57 AM

## 2023-12-19 NOTE — PROGRESS NOTES
Please refer to the [facility] ultrasound report in Ob Procedures for additional information regarding today's visit

## 2023-12-20 ENCOUNTER — ROUTINE PRENATAL (OUTPATIENT)
Dept: PERINATAL CARE | Facility: OTHER | Age: 33
End: 2023-12-20
Payer: COMMERCIAL

## 2023-12-20 VITALS
HEIGHT: 66 IN | WEIGHT: 210.8 LBS | BODY MASS INDEX: 33.88 KG/M2 | DIASTOLIC BLOOD PRESSURE: 76 MMHG | HEART RATE: 100 BPM | SYSTOLIC BLOOD PRESSURE: 128 MMHG

## 2023-12-20 DIAGNOSIS — Z3A.12 12 WEEKS GESTATION OF PREGNANCY: ICD-10-CM

## 2023-12-20 DIAGNOSIS — O99.212 MATERNAL OBESITY, ANTEPARTUM, SECOND TRIMESTER: ICD-10-CM

## 2023-12-20 DIAGNOSIS — Z36.82 ENCOUNTER FOR ANTENATAL SCREENING FOR NUCHAL TRANSLUCENCY: Primary | ICD-10-CM

## 2023-12-20 DIAGNOSIS — Z3A.08 8 WEEKS GESTATION OF PREGNANCY: ICD-10-CM

## 2023-12-20 PROCEDURE — 36415 COLL VENOUS BLD VENIPUNCTURE: CPT | Performed by: OBSTETRICS & GYNECOLOGY

## 2023-12-20 PROCEDURE — 76801 OB US < 14 WKS SINGLE FETUS: CPT | Performed by: OBSTETRICS & GYNECOLOGY

## 2023-12-20 PROCEDURE — 76813 OB US NUCHAL MEAS 1 GEST: CPT | Performed by: OBSTETRICS & GYNECOLOGY

## 2023-12-20 PROCEDURE — 99243 OFF/OP CNSLTJ NEW/EST LOW 30: CPT | Performed by: OBSTETRICS & GYNECOLOGY

## 2023-12-20 NOTE — LETTER
December 20, 2023     Jacobo Shirley MD  [address]  [address]  [place] [facility] [address]    Patient: Jaimee Bowman   YOB: 1990   Date of Visit: 12/20/2023       Dear Dr. Shirley:    Thank you for referring Jaimee Bowman to me for evaluation. Below are my notes for this consultation.    If you have questions, please do not hesitate to call me. I look forward to following your patient along with you.         Sincerely,        Valentin Crespo MD        CC: No Recipients    Valentin Crespo MD  12/20/2023  1:05 PM  Sign when Signing Visit  Please refer to the [facility] ultrasound report in Ob Procedures for additional information regarding today's visit

## 2023-12-26 ENCOUNTER — HOSPITAL ENCOUNTER (OUTPATIENT)
Dept: NEUROLOGY | Facility: CLINIC | Age: 33
Discharge: HOME | End: 2023-12-26
Payer: COMMERCIAL

## 2023-12-26 DIAGNOSIS — R20.2 PARESTHESIA OF SKIN: ICD-10-CM

## 2023-12-26 DIAGNOSIS — R29.898 OTHER SYMPTOMS AND SIGNS INVOLVING THE MUSCULOSKELETAL SYSTEM: ICD-10-CM

## 2023-12-26 LAB
CITATION REF LAB TEST: NORMAL
CLINICAL INFO: NORMAL
ETHNIC BACKGROUND STATED: NORMAL
GENE DIS ANL CARRIER INTERP-IMP: NORMAL
GENE MUT TESTED BLD/T: NORMAL
LAB DIRECTOR NAME PROVIDER: NORMAL
REASON FOR REFERRAL (NARRATIVE): NORMAL
RECOMMENDATION PATIENT DOC-IMP: NORMAL
REF LAB TEST METHOD: NORMAL
SERVICE CMNT-IMP: NORMAL
SMN1 GENE MUT ANL BLD/T: NORMAL
SPECIMEN SOURCE: NORMAL

## 2023-12-26 PROCEDURE — 95886 MUSC TEST DONE W/N TEST COMP: CPT | Performed by: PSYCHIATRY & NEUROLOGY

## 2023-12-26 PROCEDURE — 95911 NRV CNDJ TEST 9-10 STUDIES: CPT | Performed by: PSYCHIATRY & NEUROLOGY

## 2024-01-05 LAB
CF COMMENT: NORMAL
CFTR MUT ANL BLD/T: NORMAL

## 2024-01-15 ENCOUNTER — APPOINTMENT (OUTPATIENT)
Dept: LAB | Facility: HOSPITAL | Age: 34
End: 2024-01-15
Payer: COMMERCIAL

## 2024-01-15 DIAGNOSIS — O99.119 THROMBOCYTOPENIA AFFECTING PREGNANCY (HCC): ICD-10-CM

## 2024-01-15 DIAGNOSIS — D69.6 THROMBOCYTOPENIA AFFECTING PREGNANCY (HCC): ICD-10-CM

## 2024-01-15 LAB
ERYTHROCYTE [DISTWIDTH] IN BLOOD BY AUTOMATED COUNT: 15.1 % (ref 11.6–15.1)
HCT VFR BLD AUTO: 34.4 % (ref 34.8–46.1)
HGB BLD-MCNC: 11.1 G/DL (ref 11.5–15.4)
MCH RBC QN AUTO: 29.4 PG (ref 26.8–34.3)
MCHC RBC AUTO-ENTMCNC: 32.3 G/DL (ref 31.4–37.4)
MCV RBC AUTO: 91 FL (ref 82–98)
PLATELET # BLD AUTO: 163 THOUSANDS/UL (ref 149–390)
PMV BLD AUTO: 10.4 FL (ref 8.9–12.7)
RBC # BLD AUTO: 3.78 MILLION/UL (ref 3.81–5.12)
WBC # BLD AUTO: 7.24 THOUSAND/UL (ref 4.31–10.16)

## 2024-01-15 PROCEDURE — 36415 COLL VENOUS BLD VENIPUNCTURE: CPT

## 2024-01-15 PROCEDURE — 85027 COMPLETE CBC AUTOMATED: CPT

## 2024-01-18 ENCOUNTER — ROUTINE PRENATAL (OUTPATIENT)
Dept: OBGYN CLINIC | Facility: CLINIC | Age: 34
End: 2024-01-18
Payer: COMMERCIAL

## 2024-01-18 VITALS
HEIGHT: 66 IN | SYSTOLIC BLOOD PRESSURE: 126 MMHG | BODY MASS INDEX: 34.49 KG/M2 | WEIGHT: 214.6 LBS | DIASTOLIC BLOOD PRESSURE: 72 MMHG

## 2024-01-18 DIAGNOSIS — O26.891 RH NEGATIVE STATUS DURING PREGNANCY IN FIRST TRIMESTER: ICD-10-CM

## 2024-01-18 DIAGNOSIS — Z67.91 RH NEGATIVE STATUS DURING PREGNANCY IN FIRST TRIMESTER: ICD-10-CM

## 2024-01-18 DIAGNOSIS — Z36.1 NEED FOR MATERNAL SERUM ALPHA-PROTEIN (MSAFP) SCREENING: ICD-10-CM

## 2024-01-18 DIAGNOSIS — O34.211 MATERNAL CARE DUE TO LOW TRANSVERSE UTERINE SCAR FROM PREVIOUS CESAREAN DELIVERY: Primary | ICD-10-CM

## 2024-01-18 DIAGNOSIS — Z3A.17 17 WEEKS GESTATION OF PREGNANCY: ICD-10-CM

## 2024-01-18 LAB
SL AMB  POCT GLUCOSE, UA: NORMAL
SL AMB POCT URINE PROTEIN: NORMAL

## 2024-01-18 PROCEDURE — PNV: Performed by: OBSTETRICS & GYNECOLOGY

## 2024-01-18 PROCEDURE — 81002 URINALYSIS NONAUTO W/O SCOPE: CPT | Performed by: OBSTETRICS & GYNECOLOGY

## 2024-01-18 NOTE — PROGRESS NOTES
"Routine Prenatal Visit  [facility] [place]  [address] [place] [address]    Assessment/Plan:  Jaimee is a 33 y.o. year old  at 17w0d who presents for routine prenatal visit.     1. Maternal care due to low transverse uterine scar from previous  delivery  Assessment & Plan:  Interested in repeat LTCS      2. Rh negative status during pregnancy in first trimester    3. 17 weeks gestation of pregnancy  -     POCT urine dip    4. Need for maternal serum alpha-protein (MSAFP) screening  -     Alpha fetoprotein, maternal; Future; Expected date: 2024          Subjective:     CC: Prenatal care    Jaimee Bowman is a 33 y.o.  female who presents for routine prenatal care at 17w0d.  Pregnancy ROS: no leakage of fluid, pelvic pain, or vaginal bleeding.  no fetal movement.    The following portions of the patient's history were reviewed and updated as appropriate: allergies, current medications, past family history, past medical history, obstetric history, gynecologic history, past social history, past surgical history and problem list.      Objective:  /72 (BP Location: Left arm, Patient Position: Sitting, Cuff Size: Standard)   Ht 5' 6\" (1.676 m)   Wt 97.3 kg (214 lb 9.6 oz)   LMP 2023 (Exact Date)   BMI 34.64 kg/m²   Pregravid Weight/BMI: 95.3 kg (210 lb) (BMI 33.91)  Current Weight: 97.3 kg (214 lb 9.6 oz)   Total Weight Gain: 2.087 kg (4 lb 9.6 oz)   Pre-Gabe Vitals      Flowsheet Row Most Recent Value   Prenatal Assessment    Fetal Heart Rate 150   Fundal Height (cm) 16 cm   Movement Present   Prenatal Vitals    Blood Pressure 126/72   Weight - Scale 97.3 kg (214 lb 9.6 oz)   Urine Albumin/Glucose    Dilation/Effacement/Station    Vaginal Drainage    Edema              General: Well appearing, no distress  Respiratory: Unlabored breathing  Cardiovascular: Regular rate.  Abdomen: Soft, gravid, nontender  Fundal Height: Appropriate for gestational " age.  Extremities: Warm and well perfused.  Non tender.

## 2024-01-26 ENCOUNTER — APPOINTMENT (OUTPATIENT)
Dept: LAB | Facility: HOSPITAL | Age: 34
End: 2024-01-26
Payer: COMMERCIAL

## 2024-01-26 DIAGNOSIS — Z36.1 NEED FOR MATERNAL SERUM ALPHA-PROTEIN (MSAFP) SCREENING: ICD-10-CM

## 2024-01-26 PROCEDURE — 82105 ALPHA-FETOPROTEIN SERUM: CPT

## 2024-01-26 PROCEDURE — 36415 COLL VENOUS BLD VENIPUNCTURE: CPT

## 2024-01-31 LAB
2ND TRIMESTER 4 SCREEN SERPL-IMP: NORMAL
AFP ADJ MOM SERPL: 0.93
AFP INTERP AMN-IMP: NORMAL
AFP INTERP SERPL-IMP: NORMAL
AFP INTERP SERPL-IMP: NORMAL
AFP SERPL-MCNC: 33.4 NG/ML
AGE AT DELIVERY: 34.7 YR
GA METHOD: NORMAL
GA: 18.1 WEEKS
IDDM PATIENT QL: NO
MULTIPLE PREGNANCY: NO
NEURAL TUBE DEFECT RISK FETUS: NORMAL %

## 2024-02-13 PROBLEM — Z3A.20 20 WEEKS GESTATION OF PREGNANCY: Status: ACTIVE | Noted: 2023-11-21

## 2024-02-13 PROBLEM — O09.292 H/O MACROSOMIA IN INFANT IN PRIOR PREGNANCY, CURRENTLY PREGNANT, SECOND TRIMESTER: Status: ACTIVE | Noted: 2024-02-13

## 2024-02-14 ENCOUNTER — ROUTINE PRENATAL (OUTPATIENT)
Dept: OBGYN CLINIC | Facility: CLINIC | Age: 34
End: 2024-02-14
Payer: COMMERCIAL

## 2024-02-14 ENCOUNTER — ROUTINE PRENATAL (OUTPATIENT)
Dept: PERINATAL CARE | Facility: OTHER | Age: 34
End: 2024-02-14
Payer: COMMERCIAL

## 2024-02-14 VITALS
SYSTOLIC BLOOD PRESSURE: 108 MMHG | DIASTOLIC BLOOD PRESSURE: 70 MMHG | HEART RATE: 90 BPM | HEIGHT: 66 IN | BODY MASS INDEX: 35.1 KG/M2 | WEIGHT: 218.4 LBS

## 2024-02-14 VITALS
SYSTOLIC BLOOD PRESSURE: 118 MMHG | BODY MASS INDEX: 35.36 KG/M2 | WEIGHT: 220 LBS | DIASTOLIC BLOOD PRESSURE: 60 MMHG | HEIGHT: 66 IN

## 2024-02-14 DIAGNOSIS — O34.211 MATERNAL CARE DUE TO LOW TRANSVERSE UTERINE SCAR FROM PREVIOUS CESAREAN DELIVERY: ICD-10-CM

## 2024-02-14 DIAGNOSIS — Z3A.20 20 WEEKS GESTATION OF PREGNANCY: ICD-10-CM

## 2024-02-14 DIAGNOSIS — Z36.86 ENCOUNTER FOR ANTENATAL SCREENING FOR CERVICAL LENGTH: ICD-10-CM

## 2024-02-14 DIAGNOSIS — U07.1 COVID-19 AFFECTING PREGNANCY IN FIRST TRIMESTER: ICD-10-CM

## 2024-02-14 DIAGNOSIS — O98.511 COVID-19 AFFECTING PREGNANCY IN FIRST TRIMESTER: ICD-10-CM

## 2024-02-14 DIAGNOSIS — O99.210 OBESITY IN PREGNANCY: Primary | ICD-10-CM

## 2024-02-14 DIAGNOSIS — O43.199 MARGINAL INSERTION OF UMBILICAL CORD AFFECTING MANAGEMENT OF MOTHER: ICD-10-CM

## 2024-02-14 DIAGNOSIS — O34.211 MATERNAL CARE DUE TO LOW TRANSVERSE UTERINE SCAR FROM PREVIOUS CESAREAN DELIVERY: Primary | ICD-10-CM

## 2024-02-14 DIAGNOSIS — Z36.3 ENCOUNTER FOR ANTENATAL SCREENING FOR MALFORMATIONS: ICD-10-CM

## 2024-02-14 DIAGNOSIS — O09.292 H/O MACROSOMIA IN INFANT IN PRIOR PREGNANCY, CURRENTLY PREGNANT, SECOND TRIMESTER: ICD-10-CM

## 2024-02-14 DIAGNOSIS — O99.212 OBESITY AFFECTING PREGNANCY IN SECOND TRIMESTER, UNSPECIFIED OBESITY TYPE: ICD-10-CM

## 2024-02-14 LAB
SL AMB  POCT GLUCOSE, UA: NEGATIVE
SL AMB POCT URINE PROTEIN: NEGATIVE

## 2024-02-14 PROCEDURE — 81002 URINALYSIS NONAUTO W/O SCOPE: CPT | Performed by: OBSTETRICS & GYNECOLOGY

## 2024-02-14 PROCEDURE — PNV: Performed by: OBSTETRICS & GYNECOLOGY

## 2024-02-14 PROCEDURE — 76811 OB US DETAILED SNGL FETUS: CPT | Performed by: OBSTETRICS & GYNECOLOGY

## 2024-02-14 PROCEDURE — 99213 OFFICE O/P EST LOW 20 MIN: CPT | Performed by: OBSTETRICS & GYNECOLOGY

## 2024-02-14 PROCEDURE — 76817 TRANSVAGINAL US OBSTETRIC: CPT | Performed by: OBSTETRICS & GYNECOLOGY

## 2024-02-14 NOTE — LETTER
2024     Mary Cordero MD  [address]  [address]  [facility] [address]    Patient: Jaimee Bowman   YOB: 1990   Date of Visit: 2024       Dear Dr. Cordero:    Thank you for referring Jaimee Bowman to me for evaluation. Below are my notes for this consultation.    If you have questions, please do not hesitate to call me. I look forward to following your patient along with you.         Sincerely,        Britany Rodriguez MD        CC: No Recipients    Britany Rodriguez MD  2024 11:36 AM  Sign when Signing Visit  Jaimee Bowman  has no complaints today at 20w6d. She reports fetal movements and does not report any vaginal bleeding or signs of labor.  Her recently completed fetal testing revealed a Normal NIPS (M) and MSAFP 0.93 . She is here today for an ultrasound for fetal anatomy.    Problem list:  Obesity based on a pregravid BMI of 33.9.  An early Glucola was normal.  History of a prior  for failure to progress in the second stage after pushing for 2 hours.  She is undecided on TOLAC.  Prior macrosomia 8lb 14 oz  COVID infection during pregnancy at 12 weeks       Ultrasound findings:  The ultrasound today shows normal interval fetal growth and fluid, normal cervical length, and no malformations were detected.  Marginal PCI is noted.    Pregnancy ultrasound has limitations and is unable to detect all forms of fetal congenital abnormalities.      Specific counseling was provided on the following problems:  Marginal cord insertion:  Marginal cord is defined as a placental cord insertion less than 2 cm from the edge of the placental dis. This is largely considered an anatomic variant. It is relatively common with an incidence of approximately 6%. We discussed that this has a weak association with fetal growth restriction which is why a third-trimester growth ultrasound is typically recommended.    Follow up recommended:   Recommend a follow-up ultrasound at 32  weeks for growth.    Pre visit time reviewing her records   10 minutes  Face to face time 5 minutes  Post visit time on documentation of note, updating her problem list, adding orders and prescriptions 5 minutes.  Procedures that were completed today were charged separately.   The level of decision making was low level complexity.    Britany Rodriguez MD

## 2024-02-14 NOTE — PROGRESS NOTES
"Routine Prenatal Visit  [facility] - [place] [address], [place] [address]    Assessment/Plan:  Jaimee is a 33 y.o. year old  at 20w6d who presents for routine prenatal visit.     1. Maternal care due to low transverse uterine scar from previous  delivery  Assessment & Plan:  Discussed at length option of TOLAC vs repeat LTCS including risks and benefits.  She is likely to have repeat LTCS.  Aware that if goes into labor spontaneously she can change her mind.  Will have  call to arrange at 39 weeks.      2. Marginal insertion of umbilical cord affecting management of mother  Assessment & Plan:  3rd trimester growth.      3. Obesity affecting pregnancy in second trimester, unspecified obesity type  Assessment & Plan:  Early 1hr GTT normal, repeat at 24-28 weeks.      4. 20 weeks gestation of pregnancy  -     POCT urine dip        Next OB Visit 4 weeks.    Subjective:     CC: Prenatal care    Jaimee Bowman is a 33 y.o.  female who presents for routine prenatal care at 20w6d.  Pregnancy ROS: no leakage of fluid, pelvic pain, or vaginal bleeding.  normal fetal movement.    The following portions of the patient's history were reviewed and updated as appropriate: allergies, current medications, past family history, past medical history, obstetric history, gynecologic history, past social history, past surgical history and problem list.      Objective:  /60   Ht 5' 6\" (1.676 m)   Wt 99.8 kg (220 lb)   LMP 2023 (Exact Date)   BMI 35.51 kg/m²   Pregravid Weight/BMI: 95.3 kg (210 lb) (BMI 33.91)  Current Weight: 99.8 kg (220 lb)   Total Weight Gain: 4.536 kg (10 lb)   Pre-Gabe Vitals      Flowsheet Row Most Recent Value   Prenatal Assessment    Fetal Heart Rate 149   Fundal Height (cm) 29 cm   Movement Present   Prenatal Vitals    Blood Pressure 118/60   Weight - Scale 99.8 kg (220 lb)   Urine Albumin/Glucose    Dilation/Effacement/Station  "   Vaginal Drainage    Edema    LLE Edema None   RLE Edema None   Facial Edema None             General: Well appearing, no distress  Abdomen: Soft, gravid, nontender  Extremities: Non tender.

## 2024-02-14 NOTE — PROGRESS NOTES
Ultrasound Probe Disinfection    A transvaginal ultrasound was performed.   Prior to use, disinfection was performed with High Level Disinfection Process ([employer]).  Probe serial number [id number] was used.      Iliana Hermosillo  02/14/24  10:21 AM

## 2024-02-14 NOTE — PROGRESS NOTES
Jaimee Bowman  has no complaints today at 20w6d. She reports fetal movements and does not report any vaginal bleeding or signs of labor.  Her recently completed fetal testing revealed a Normal NIPS (M) and MSAFP 0.93 . She is here today for an ultrasound for fetal anatomy.    Problem list:  Obesity based on a pregravid BMI of 33.9.  An early Glucola was normal.  History of a prior  for failure to progress in the second stage after pushing for 2 hours.  She is undecided on TOLAC.  Prior macrosomia 8lb 14 oz  COVID infection during pregnancy at 12 weeks       Ultrasound findings:  The ultrasound today shows normal interval fetal growth and fluid, normal cervical length, and no malformations were detected.  Marginal PCI is noted.    Pregnancy ultrasound has limitations and is unable to detect all forms of fetal congenital abnormalities.      Specific counseling was provided on the following problems:  Marginal cord insertion:  Marginal cord is defined as a placental cord insertion less than 2 cm from the edge of the placental dis. This is largely considered an anatomic variant. It is relatively common with an incidence of approximately 6%. We discussed that this has a weak association with fetal growth restriction which is why a third-trimester growth ultrasound is typically recommended.    Follow up recommended:   Recommend a follow-up ultrasound at 32 weeks for growth.    Pre visit time reviewing her records   10 minutes  Face to face time 5 minutes  Post visit time on documentation of note, updating her problem list, adding orders and prescriptions 5 minutes.  Procedures that were completed today were charged separately.   The level of decision making was low level complexity.    Britany Rodriguez MD

## 2024-02-14 NOTE — PATIENT INSTRUCTIONS
- Continue Prenatal vitamin and all prescribed medications.    - Try to drink 64 oz of water or other non-caffeinated fluids daily.  - Avoid laying flat on your back for more than a few minutes for exercise or sleep.  Tilted to right or left side is fine.  - Call office if leaking fluid, bleeding, or contractions >5-6/hour which don't decrease with rest, oral hydration, or emptying bladder.

## 2024-02-14 NOTE — ASSESSMENT & PLAN NOTE
Discussed at length option of TOLAC vs repeat LTCS including risks and benefits.  She is likely to have repeat LTCS.  Aware that if goes into labor spontaneously she can change her mind.  Will have  call to arrange at 39 weeks.

## 2024-02-21 ENCOUNTER — TELEPHONE (OUTPATIENT)
Dept: OBGYN CLINIC | Facility: CLINIC | Age: 34
End: 2024-02-21

## 2024-02-21 NOTE — TELEPHONE ENCOUNTER
----- Message from Mary Cordero MD sent at 2/14/2024  2:31 PM EST -----  Regarding: repeat LTCS  Please call to schedule repeat LTCS at 39 weeks.

## 2024-03-14 ENCOUNTER — ROUTINE PRENATAL (OUTPATIENT)
Dept: OBGYN CLINIC | Facility: CLINIC | Age: 34
End: 2024-03-14
Payer: COMMERCIAL

## 2024-03-14 VITALS
DIASTOLIC BLOOD PRESSURE: 74 MMHG | HEIGHT: 66 IN | SYSTOLIC BLOOD PRESSURE: 124 MMHG | BODY MASS INDEX: 35.2 KG/M2 | WEIGHT: 219 LBS

## 2024-03-14 DIAGNOSIS — Z3A.25 25 WEEKS GESTATION OF PREGNANCY: Primary | ICD-10-CM

## 2024-03-14 DIAGNOSIS — Z36.89 ENCOUNTER FOR OTHER SPECIFIED ANTENATAL SCREENING: ICD-10-CM

## 2024-03-14 DIAGNOSIS — O26.892 RH NEGATIVE STATUS DURING PREGNANCY IN SECOND TRIMESTER: ICD-10-CM

## 2024-03-14 DIAGNOSIS — O99.212 OBESITY AFFECTING PREGNANCY IN SECOND TRIMESTER, UNSPECIFIED OBESITY TYPE: ICD-10-CM

## 2024-03-14 DIAGNOSIS — D69.6 THROMBOCYTOPENIA AFFECTING PREGNANCY (HCC): ICD-10-CM

## 2024-03-14 DIAGNOSIS — O99.119 THROMBOCYTOPENIA AFFECTING PREGNANCY (HCC): ICD-10-CM

## 2024-03-14 DIAGNOSIS — Z67.91 RH NEGATIVE STATUS DURING PREGNANCY IN SECOND TRIMESTER: ICD-10-CM

## 2024-03-14 DIAGNOSIS — O34.211 MATERNAL CARE DUE TO LOW TRANSVERSE UTERINE SCAR FROM PREVIOUS CESAREAN DELIVERY: ICD-10-CM

## 2024-03-14 DIAGNOSIS — O43.199 MARGINAL INSERTION OF UMBILICAL CORD AFFECTING MANAGEMENT OF MOTHER: ICD-10-CM

## 2024-03-14 LAB
SL AMB  POCT GLUCOSE, UA: ABNORMAL
SL AMB POCT URINE PROTEIN: ABNORMAL

## 2024-03-14 PROCEDURE — 81002 URINALYSIS NONAUTO W/O SCOPE: CPT | Performed by: OBSTETRICS & GYNECOLOGY

## 2024-03-14 PROCEDURE — PNV: Performed by: OBSTETRICS & GYNECOLOGY

## 2024-03-14 NOTE — PROGRESS NOTES
"Routine Prenatal Visit  [facility] - [address], [address], [place] [address]    Assessment/Plan:  Jaimee is a 33 y.o. year old  at 25w0d who presents for routine prenatal visit.     1. 25 weeks gestation of pregnancy  -     POCT urine dip    2. Encounter for other specified  screening  -     Glucose, 1H PG; Future  -     ABO/Rh; Future  -     Antibody screen; Future  -     CBC; Future  -     RPR, Rfx Qn RPR/Confirm TP; Future  -     Glucose, 1H PG  -     ABO/Rh  -     Antibody screen  -     CBC  -     RPR, Rfx Qn RPR/Confirm TP    3. Maternal care due to low transverse uterine scar from previous  delivery    4. Obesity affecting pregnancy in second trimester, unspecified obesity type    5. Thrombocytopenia affecting pregnancy (HCC)    6. Rh negative status during pregnancy in second trimester    7. Marginal insertion of umbilical cord affecting management of mother        Next OB Visit 4 weeks.    Subjective:     CC: Prenatal care    Jaimee Bowman is a 33 y.o.  female who presents for routine prenatal care at 25w0d.  Pregnancy ROS: no leakage of fluid, pelvic pain, or vaginal bleeding.  normal fetal movement.    The following portions of the patient's history were reviewed and updated as appropriate: allergies, current medications, past family history, past medical history, obstetric history, gynecologic history, past social history, past surgical history and problem list.      Objective:  /74 (BP Location: Right arm, Patient Position: Sitting, Cuff Size: Standard)   Ht 5' 6\" (1.676 m)   Wt 99.3 kg (219 lb)   LMP 2023 (Exact Date)   BMI 35.35 kg/m²   Pregravid Weight/BMI: 95.3 kg (210 lb) (BMI 33.91)  Current Weight: 99.3 kg (219 lb)   Total Weight Gain: 4.082 kg (9 lb)   Pre-Gabe Vitals      Flowsheet Row Most Recent Value   Prenatal Assessment    Fetal Heart Rate 150   Fundal Height (cm) 25 cm   Movement Present   Prenatal Vitals    Blood " Pressure 124/74   Weight - Scale 99.3 kg (219 lb)   Urine Albumin/Glucose    Dilation/Effacement/Station    Vaginal Drainage    Draining Fluid No   Edema              General: Well appearing, no distress  Abdomen: Soft, gravid, nontender  Extremities: Non tender.

## 2024-04-09 ENCOUNTER — APPOINTMENT (OUTPATIENT)
Dept: LAB | Facility: HOSPITAL | Age: 34
End: 2024-04-09
Payer: COMMERCIAL

## 2024-04-09 DIAGNOSIS — Z00.00 ROUTINE MEDICAL EXAM: ICD-10-CM

## 2024-04-09 LAB
ABO GROUP BLD: NORMAL
BASOPHILS # BLD AUTO: 0.04 THOUSANDS/ÂΜL (ref 0–0.1)
BASOPHILS NFR BLD AUTO: 1 % (ref 0–1)
BLD GP AB SCN SERPL QL: NEGATIVE
EOSINOPHIL # BLD AUTO: 0.19 THOUSAND/ÂΜL (ref 0–0.61)
EOSINOPHIL NFR BLD AUTO: 3 % (ref 0–6)
ERYTHROCYTE [DISTWIDTH] IN BLOOD BY AUTOMATED COUNT: 15.1 % (ref 11.6–15.1)
GLUCOSE 1H P 50 G GLC PO SERPL-MCNC: 123 MG/DL (ref 70–134)
HCT VFR BLD AUTO: 33.6 % (ref 34.8–46.1)
HGB BLD-MCNC: 10.8 G/DL (ref 11.5–15.4)
IMM GRANULOCYTES # BLD AUTO: 0.09 THOUSAND/UL (ref 0–0.2)
IMM GRANULOCYTES NFR BLD AUTO: 1 % (ref 0–2)
LYMPHOCYTES # BLD AUTO: 1.29 THOUSANDS/ÂΜL (ref 0.6–4.47)
LYMPHOCYTES NFR BLD AUTO: 17 % (ref 14–44)
MCH RBC QN AUTO: 28.8 PG (ref 26.8–34.3)
MCHC RBC AUTO-ENTMCNC: 32.1 G/DL (ref 31.4–37.4)
MCV RBC AUTO: 90 FL (ref 82–98)
MONOCYTES # BLD AUTO: 0.35 THOUSAND/ÂΜL (ref 0.17–1.22)
MONOCYTES NFR BLD AUTO: 5 % (ref 4–12)
NEUTROPHILS # BLD AUTO: 5.61 THOUSANDS/ÂΜL (ref 1.85–7.62)
NEUTS SEG NFR BLD AUTO: 73 % (ref 43–75)
NRBC BLD AUTO-RTO: 0 /100 WBCS
PLATELET # BLD AUTO: 174 THOUSANDS/UL (ref 149–390)
PMV BLD AUTO: 10.5 FL (ref 8.9–12.7)
RBC # BLD AUTO: 3.75 MILLION/UL (ref 3.81–5.12)
RH BLD: NEGATIVE
SPECIMEN EXPIRATION DATE: NORMAL
TREPONEMA PALLIDUM IGG+IGM AB [PRESENCE] IN SERUM OR PLASMA BY IMMUNOASSAY: NORMAL
WBC # BLD AUTO: 7.57 THOUSAND/UL (ref 4.31–10.16)

## 2024-04-09 PROCEDURE — 86850 RBC ANTIBODY SCREEN: CPT

## 2024-04-09 PROCEDURE — 36415 COLL VENOUS BLD VENIPUNCTURE: CPT

## 2024-04-09 PROCEDURE — 86901 BLOOD TYPING SEROLOGIC RH(D): CPT

## 2024-04-09 PROCEDURE — 85025 COMPLETE CBC W/AUTO DIFF WBC: CPT

## 2024-04-09 PROCEDURE — 86780 TREPONEMA PALLIDUM: CPT

## 2024-04-09 PROCEDURE — 82950 GLUCOSE TEST: CPT

## 2024-04-09 PROCEDURE — 86900 BLOOD TYPING SEROLOGIC ABO: CPT

## 2024-04-11 ENCOUNTER — ROUTINE PRENATAL (OUTPATIENT)
Dept: OBGYN CLINIC | Facility: CLINIC | Age: 34
End: 2024-04-11
Payer: COMMERCIAL

## 2024-04-11 VITALS
HEIGHT: 66 IN | WEIGHT: 225 LBS | BODY MASS INDEX: 36.16 KG/M2 | DIASTOLIC BLOOD PRESSURE: 68 MMHG | SYSTOLIC BLOOD PRESSURE: 102 MMHG

## 2024-04-11 DIAGNOSIS — Z3A.29 29 WEEKS GESTATION OF PREGNANCY: Primary | ICD-10-CM

## 2024-04-11 DIAGNOSIS — O99.212 OBESITY AFFECTING PREGNANCY IN SECOND TRIMESTER, UNSPECIFIED OBESITY TYPE: ICD-10-CM

## 2024-04-11 DIAGNOSIS — O99.119 THROMBOCYTOPENIA AFFECTING PREGNANCY (HCC): ICD-10-CM

## 2024-04-11 DIAGNOSIS — Z67.91 RH NEGATIVE STATUS DURING PREGNANCY IN FIRST TRIMESTER: ICD-10-CM

## 2024-04-11 DIAGNOSIS — Z23 NEED FOR TDAP VACCINATION: ICD-10-CM

## 2024-04-11 DIAGNOSIS — O43.199 MARGINAL INSERTION OF UMBILICAL CORD AFFECTING MANAGEMENT OF MOTHER: ICD-10-CM

## 2024-04-11 DIAGNOSIS — O26.891 RH NEGATIVE STATUS DURING PREGNANCY IN FIRST TRIMESTER: ICD-10-CM

## 2024-04-11 DIAGNOSIS — Z29.13 NEED FOR RHOGAM DUE TO RH NEGATIVE MOTHER: ICD-10-CM

## 2024-04-11 DIAGNOSIS — D69.6 THROMBOCYTOPENIA AFFECTING PREGNANCY (HCC): ICD-10-CM

## 2024-04-11 DIAGNOSIS — O34.211 MATERNAL CARE DUE TO LOW TRANSVERSE UTERINE SCAR FROM PREVIOUS CESAREAN DELIVERY: ICD-10-CM

## 2024-04-11 LAB
SL AMB  POCT GLUCOSE, UA: NORMAL
SL AMB POCT URINE PROTEIN: NORMAL

## 2024-04-11 PROCEDURE — 96372 THER/PROPH/DIAG INJ SC/IM: CPT | Performed by: NURSE PRACTITIONER

## 2024-04-11 PROCEDURE — PNV: Performed by: NURSE PRACTITIONER

## 2024-04-11 PROCEDURE — 90471 IMMUNIZATION ADMIN: CPT | Performed by: NURSE PRACTITIONER

## 2024-04-11 PROCEDURE — 81002 URINALYSIS NONAUTO W/O SCOPE: CPT | Performed by: NURSE PRACTITIONER

## 2024-04-11 PROCEDURE — 90715 TDAP VACCINE 7 YRS/> IM: CPT | Performed by: NURSE PRACTITIONER

## 2024-04-11 NOTE — PROGRESS NOTES
"Routine Prenatal Visit  [facility] [place]  [address] [place] [address]    Assessment/Plan:  Jaimee is a 33 y.o. year old  at 29w0d who presents for routine prenatal visit.     1. 29 weeks gestation of pregnancy  -     POCT urine dip    2. Need for Tdap vaccination  -     TDAP VACCINE GREATER THAN OR EQUAL TO 6YO IM    3. Need for rhogam due to Rh negative mother  -     Rho(D) immune globulin (RHOGAM ULTRA-FILTERED PLUS) IM injection 300 mcg    4. Maternal care due to low transverse uterine scar from previous  delivery  Assessment & Plan:  2024 Dr. Jenkins       5. Obesity affecting pregnancy in second trimester, unspecified obesity type  Assessment & Plan:  Normal 1 hour gtt      6. Thrombocytopenia affecting pregnancy (HCC)  Assessment & Plan:   plt 174      7. Rh negative status during pregnancy in first trimester  Assessment & Plan:  Rhogam given in office today      8. Marginal insertion of umbilical cord affecting management of mother  Assessment & Plan:  Growth US @ 32 weeks, scheduled        Doing well  Reviewed s/s PTL, [facility]  Call with any concerns  Next OB Visit 2 weeks.    Subjective:     CC: Prenatal care    Jaimee Bowman is a 33 y.o.  female who presents for routine prenatal care at 29w0d. Pt feels well and has no complaints.   Pregnancy ROS: no leakage of fluid, pelvic pain, or vaginal bleeding.  normal fetal movement.    The following portions of the patient's history were reviewed and updated as appropriate: allergies, current medications, past family history, past medical history, obstetric history, gynecologic history, past social history, past surgical history and problem list.      Objective:  /68 (BP Location: Left arm, Patient Position: Sitting, Cuff Size: Standard)   Ht 5' 6\" (1.676 m)   Wt 102 kg (225 lb)   LMP 2023 (Exact Date)   BMI 36.32 kg/m²   Pregravid Weight/BMI: 95.3 kg (210 lb) (BMI 33.91)  Current Weight: 102 kg " (225 lb)   Total Weight Gain: 6.804 kg (15 lb)   Pre-Gabe Vitals      Flowsheet Row Most Recent Value   Prenatal Assessment    Fetal Heart Rate 143   Fundal Height (cm) 30 cm   Movement Present   Presentation Vertex   Prenatal Vitals    Blood Pressure 102/68   Weight - Scale 102 kg (225 lb)   Urine Albumin/Glucose    Dilation/Effacement/Station    Vaginal Drainage    Draining Fluid No   Edema    LLE Edema None   RLE Edema None   Facial Edema None             General: Well appearing, no distress  Abdomen: Soft, gravid, nontender  Extremities: Non tender.

## 2024-04-15 PROBLEM — D50.8 IRON DEFICIENCY ANEMIA SECONDARY TO INADEQUATE DIETARY IRON INTAKE: Status: ACTIVE | Noted: 2024-04-15

## 2024-04-26 ENCOUNTER — ROUTINE PRENATAL (OUTPATIENT)
Dept: OBGYN CLINIC | Facility: CLINIC | Age: 34
End: 2024-04-26
Payer: COMMERCIAL

## 2024-04-26 VITALS
DIASTOLIC BLOOD PRESSURE: 76 MMHG | BODY MASS INDEX: 36 KG/M2 | SYSTOLIC BLOOD PRESSURE: 118 MMHG | HEIGHT: 66 IN | WEIGHT: 224 LBS

## 2024-04-26 DIAGNOSIS — O43.199 MARGINAL INSERTION OF UMBILICAL CORD AFFECTING MANAGEMENT OF MOTHER: ICD-10-CM

## 2024-04-26 DIAGNOSIS — O34.211 MATERNAL CARE DUE TO LOW TRANSVERSE UTERINE SCAR FROM PREVIOUS CESAREAN DELIVERY: Primary | ICD-10-CM

## 2024-04-26 DIAGNOSIS — D50.8 IRON DEFICIENCY ANEMIA SECONDARY TO INADEQUATE DIETARY IRON INTAKE: ICD-10-CM

## 2024-04-26 DIAGNOSIS — Z3A.31 31 WEEKS GESTATION OF PREGNANCY: ICD-10-CM

## 2024-04-26 DIAGNOSIS — O99.213 OBESITY AFFECTING PREGNANCY IN THIRD TRIMESTER, UNSPECIFIED OBESITY TYPE: ICD-10-CM

## 2024-04-26 LAB
SL AMB  POCT GLUCOSE, UA: NORMAL
SL AMB POCT URINE PROTEIN: NORMAL

## 2024-04-26 PROCEDURE — PNV: Performed by: OBSTETRICS & GYNECOLOGY

## 2024-04-26 PROCEDURE — 81002 URINALYSIS NONAUTO W/O SCOPE: CPT | Performed by: OBSTETRICS & GYNECOLOGY

## 2024-04-26 NOTE — PROGRESS NOTES
"Routine Prenatal Visit  [facility] [address], [address], [place] [address]    Assessment/Plan:  Jaimee is a 33 y.o. year old  at 31w1d who presents for routine prenatal visit.     1. Maternal care due to low transverse uterine scar from previous  delivery  Assessment & Plan:  C section scheduled      2. Obesity affecting pregnancy in third trimester, unspecified obesity type    3. Marginal insertion of umbilical cord affecting management of mother  Assessment & Plan:  Has growth in 1.5 weeks      4. Iron deficiency anemia secondary to inadequate dietary iron intake    5. 31 weeks gestation of pregnancy  -     POCT urine dip          Subjective:   Jaimee Bowman is a 33 y.o.  who presents for routine prenatal care at 31w1d.  Complaints today: Denies  LOF: -; VB: -; Contractions: -; FM: +    Objective:  /76 (BP Location: Left arm, Patient Position: Sitting, Cuff Size: Standard)   Ht 5' 6\" (1.676 m)   Wt 102 kg (224 lb)   LMP 2023 (Exact Date)   BMI 36.15 kg/m²     General: Well appearing, no distress  Respiratory: Unlabored breathing  Abdomen: Soft, gravid, nontender  Extremities: Warm and well perfused.  Non tender.    Pregravid Weight/BMI: 95.3 kg (210 lb) (BMI 33.91)  Current Weight: 102 kg (224 lb)   Total Weight Gain: 6.35 kg (14 lb)     Pre-Gabe Vitals      Flowsheet Row Most Recent Value   Prenatal Assessment    Fetal Heart Rate 160   Movement Present   Prenatal Vitals    Blood Pressure 118/76   Weight - Scale 102 kg (224 lb)   Urine Albumin/Glucose    Dilation/Effacement/Station    Vaginal Drainage    Edema              Roula Wilburn DO  2024 10:31 AM    "

## 2024-05-09 ENCOUNTER — ROUTINE PRENATAL (OUTPATIENT)
Dept: OBGYN CLINIC | Facility: CLINIC | Age: 34
End: 2024-05-09
Payer: COMMERCIAL

## 2024-05-09 VITALS
DIASTOLIC BLOOD PRESSURE: 82 MMHG | HEIGHT: 66 IN | BODY MASS INDEX: 36.71 KG/M2 | SYSTOLIC BLOOD PRESSURE: 116 MMHG | WEIGHT: 228.4 LBS

## 2024-05-09 DIAGNOSIS — Z67.91 RH NEGATIVE STATUS DURING PREGNANCY IN THIRD TRIMESTER: ICD-10-CM

## 2024-05-09 DIAGNOSIS — O26.893 RH NEGATIVE STATUS DURING PREGNANCY IN THIRD TRIMESTER: ICD-10-CM

## 2024-05-09 DIAGNOSIS — Z3A.33 33 WEEKS GESTATION OF PREGNANCY: Primary | ICD-10-CM

## 2024-05-09 DIAGNOSIS — O99.213 OBESITY AFFECTING PREGNANCY IN THIRD TRIMESTER, UNSPECIFIED OBESITY TYPE: ICD-10-CM

## 2024-05-09 DIAGNOSIS — O34.211 MATERNAL CARE DUE TO LOW TRANSVERSE UTERINE SCAR FROM PREVIOUS CESAREAN DELIVERY: ICD-10-CM

## 2024-05-09 DIAGNOSIS — O99.119 THROMBOCYTOPENIA AFFECTING PREGNANCY (HCC): ICD-10-CM

## 2024-05-09 DIAGNOSIS — O43.199 MARGINAL INSERTION OF UMBILICAL CORD AFFECTING MANAGEMENT OF MOTHER: ICD-10-CM

## 2024-05-09 DIAGNOSIS — D50.8 IRON DEFICIENCY ANEMIA SECONDARY TO INADEQUATE DIETARY IRON INTAKE: ICD-10-CM

## 2024-05-09 DIAGNOSIS — D69.6 THROMBOCYTOPENIA AFFECTING PREGNANCY (HCC): ICD-10-CM

## 2024-05-09 LAB
SL AMB  POCT GLUCOSE, UA: NORMAL
SL AMB POCT URINE PROTEIN: NORMAL

## 2024-05-09 PROCEDURE — PNV: Performed by: NURSE PRACTITIONER

## 2024-05-09 PROCEDURE — 81002 URINALYSIS NONAUTO W/O SCOPE: CPT | Performed by: NURSE PRACTITIONER

## 2024-05-09 NOTE — PROGRESS NOTES
"Routine Prenatal Visit  [facility] - [place]  [address], [place] [address]    Assessment/Plan:  Jaimee is a 33 y.o. year old  at 33w0d who presents for routine prenatal visit.     1. 33 weeks gestation of pregnancy  -     POCT urine dip    2. Maternal care due to low transverse uterine scar from previous  delivery  Assessment & Plan:   Dr. Jenkins      3. Obesity affecting pregnancy in third trimester, unspecified obesity type    4. Thrombocytopenia affecting pregnancy (HCC)  Assessment & Plan:   174      5. Rh negative status during pregnancy in third trimester    6. Marginal insertion of umbilical cord affecting management of mother  Assessment & Plan:  Follow up growth scan 5/10      7. Iron deficiency anemia secondary to inadequate dietary iron intake  Assessment & Plan:  Taking Slow-fe once daily        Discussed fiber for constipation  Reviewed [facility], PTL precautions, call with any concerns  Next OB Visit 2 weeks.    Subjective:     CC: Prenatal care    Jaimee Bowman is a 33 y.o.  female who presents for routine prenatal care at 33w0d.  Feels well today, no complaints.   Pregnancy ROS: no leakage of fluid, pelvic pain, or vaginal bleeding.  normal fetal movement.    The following portions of the patient's history were reviewed and updated as appropriate: allergies, current medications, past family history, past medical history, obstetric history, gynecologic history, past social history, past surgical history and problem list.      Objective:  /82 (BP Location: Left arm, Patient Position: Sitting, Cuff Size: Standard)   Ht 5' 6\" (1.676 m)   Wt 104 kg (228 lb 6.4 oz)   LMP 2023 (Exact Date)   BMI 36.86 kg/m²   Pregravid Weight/BMI: 95.3 kg (210 lb) (BMI 33.91)  Current Weight: 104 kg (228 lb 6.4 oz)   Total Weight Gain: 8.346 kg (18 lb 6.4 oz)   Pre- Vitals      Flowsheet Row Most Recent Value   Prenatal Assessment    Fetal Heart Rate 143 "   Fundal Height (cm) 34 cm   Movement Present   Presentation Breech   Prenatal Vitals    Blood Pressure 116/82   Weight - Scale 104 kg (228 lb 6.4 oz)   Urine Albumin/Glucose    Dilation/Effacement/Station    Vaginal Drainage    Draining Fluid No   Edema    LLE Edema None   RLE Edema None   Facial Edema None             General: Well appearing, no distress  Abdomen: Soft, gravid, nontender  Extremities: Non tender.

## 2024-05-10 ENCOUNTER — ULTRASOUND (OUTPATIENT)
Dept: PERINATAL CARE | Facility: OTHER | Age: 34
End: 2024-05-10
Payer: COMMERCIAL

## 2024-05-10 VITALS
WEIGHT: 227 LBS | BODY MASS INDEX: 36.48 KG/M2 | HEIGHT: 66 IN | DIASTOLIC BLOOD PRESSURE: 68 MMHG | HEART RATE: 85 BPM | SYSTOLIC BLOOD PRESSURE: 112 MMHG

## 2024-05-10 DIAGNOSIS — E66.09 OTHER OBESITY DUE TO EXCESS CALORIES AFFECTING PREGNANCY IN THIRD TRIMESTER: Primary | ICD-10-CM

## 2024-05-10 DIAGNOSIS — O99.213 OTHER OBESITY DUE TO EXCESS CALORIES AFFECTING PREGNANCY IN THIRD TRIMESTER: Primary | ICD-10-CM

## 2024-05-10 DIAGNOSIS — Z3A.33 33 WEEKS GESTATION OF PREGNANCY: ICD-10-CM

## 2024-05-10 PROCEDURE — 76816 OB US FOLLOW-UP PER FETUS: CPT | Performed by: OBSTETRICS & GYNECOLOGY

## 2024-05-10 PROCEDURE — 99212 OFFICE O/P EST SF 10 MIN: CPT | Performed by: OBSTETRICS & GYNECOLOGY

## 2024-05-10 NOTE — PROGRESS NOTES
The patient was seen today for an ultrasound.  Please see ultrasound report (located under Ob Procedures) for additional details.   Thank you very much for allowing us to participate in the care of this very nice patient.  Should you have any questions, please do not hesitate to contact me.     Davie Cerrato MD FACOG  Attending Physician, Maternal-Fetal Medicine  [facility]

## 2024-05-22 ENCOUNTER — ROUTINE PRENATAL (OUTPATIENT)
Dept: OBGYN CLINIC | Facility: CLINIC | Age: 34
End: 2024-05-22
Payer: COMMERCIAL

## 2024-05-22 VITALS
SYSTOLIC BLOOD PRESSURE: 112 MMHG | BODY MASS INDEX: 36.87 KG/M2 | DIASTOLIC BLOOD PRESSURE: 80 MMHG | WEIGHT: 229.4 LBS | HEIGHT: 66 IN

## 2024-05-22 DIAGNOSIS — O43.199 MARGINAL INSERTION OF UMBILICAL CORD AFFECTING MANAGEMENT OF MOTHER: ICD-10-CM

## 2024-05-22 DIAGNOSIS — Z67.91 RH NEGATIVE STATUS DURING PREGNANCY IN THIRD TRIMESTER: ICD-10-CM

## 2024-05-22 DIAGNOSIS — Z3A.34 34 WEEKS GESTATION OF PREGNANCY: ICD-10-CM

## 2024-05-22 DIAGNOSIS — O34.211 MATERNAL CARE DUE TO LOW TRANSVERSE UTERINE SCAR FROM PREVIOUS CESAREAN DELIVERY: Primary | ICD-10-CM

## 2024-05-22 DIAGNOSIS — D69.6 THROMBOCYTOPENIA AFFECTING PREGNANCY (HCC): ICD-10-CM

## 2024-05-22 DIAGNOSIS — O26.893 RH NEGATIVE STATUS DURING PREGNANCY IN THIRD TRIMESTER: ICD-10-CM

## 2024-05-22 DIAGNOSIS — O99.119 THROMBOCYTOPENIA AFFECTING PREGNANCY (HCC): ICD-10-CM

## 2024-05-22 PROCEDURE — 81002 URINALYSIS NONAUTO W/O SCOPE: CPT | Performed by: OBSTETRICS & GYNECOLOGY

## 2024-05-22 PROCEDURE — PNV: Performed by: OBSTETRICS & GYNECOLOGY

## 2024-05-22 NOTE — PROGRESS NOTES
"Routine Prenatal Visit  [facility] - [address], [address], [place] [address]    Assessment/Plan:  Jaimee is a 33 y.o. year old  at 34w6d who presents for routine prenatal visit.     1. Maternal care due to low transverse uterine scar from previous  delivery  2. Thrombocytopenia affecting pregnancy (HCC)  3. Rh negative status during pregnancy in third trimester  4. Marginal insertion of umbilical cord affecting management of mother  5. 34 weeks gestation of pregnancy  -     POCT urine dip        Subjective:     CC: Prenatal care    Jaimee Bowman is a 33 y.o.  female who presents for routine prenatal care at 34w6d.  Pregnancy ROS: no leakage of fluid, pelvic pain, or vaginal bleeding.  normal fetal movement.    The following portions of the patient's history were reviewed and updated as appropriate: allergies, current medications, past family history, past medical history, obstetric history, gynecologic history, past social history, past surgical history and problem list.      Objective:  /80 (BP Location: Left arm, Patient Position: Sitting, Cuff Size: Standard)   Ht 5' 6\" (1.676 m)   Wt 104 kg (229 lb 6.4 oz)   LMP 2023 (Exact Date)   BMI 37.03 kg/m²   Pregravid Weight/BMI: 95.3 kg (210 lb) (BMI 33.91)  Current Weight: 104 kg (229 lb 6.4 oz)   Total Weight Gain: 8.8 kg (19 lb 6.4 oz)   Pre-Gabe Vitals      Flowsheet Row Most Recent Value   Prenatal Assessment    Fetal Heart Rate 140   Fundal Height (cm) 35 cm   Movement Present   Presentation Vertex   Prenatal Vitals    Blood Pressure 112/80   Weight - Scale 104 kg (229 lb 6.4 oz)   Urine Albumin/Glucose    Dilation/Effacement/Station    Vaginal Drainage    Edema              General: Well appearing, no distress  Respiratory: Unlabored breathing  Cardiovascular: Regular rate.  Abdomen: Soft, gravid, nontender  Fundal Height: Appropriate for gestational age.  Extremities: Warm and well perfused.  Non " tender.

## 2024-06-04 ENCOUNTER — ROUTINE PRENATAL (OUTPATIENT)
Dept: OBGYN CLINIC | Facility: CLINIC | Age: 34
End: 2024-06-04
Payer: COMMERCIAL

## 2024-06-04 VITALS — WEIGHT: 231 LBS | SYSTOLIC BLOOD PRESSURE: 116 MMHG | BODY MASS INDEX: 37.28 KG/M2 | DIASTOLIC BLOOD PRESSURE: 70 MMHG

## 2024-06-04 DIAGNOSIS — O34.211 MATERNAL CARE DUE TO LOW TRANSVERSE UTERINE SCAR FROM PREVIOUS CESAREAN DELIVERY: ICD-10-CM

## 2024-06-04 DIAGNOSIS — O99.213 OTHER OBESITY DUE TO EXCESS CALORIES AFFECTING PREGNANCY IN THIRD TRIMESTER: ICD-10-CM

## 2024-06-04 DIAGNOSIS — O43.199 MARGINAL INSERTION OF UMBILICAL CORD AFFECTING MANAGEMENT OF MOTHER: ICD-10-CM

## 2024-06-04 DIAGNOSIS — E66.09 OTHER OBESITY DUE TO EXCESS CALORIES AFFECTING PREGNANCY IN THIRD TRIMESTER: ICD-10-CM

## 2024-06-04 DIAGNOSIS — Z3A.36 36 WEEKS GESTATION OF PREGNANCY: Primary | ICD-10-CM

## 2024-06-04 DIAGNOSIS — Z36.85 ANTENATAL SCREENING FOR STREPTOCOCCUS B: ICD-10-CM

## 2024-06-04 LAB
SL AMB  POCT GLUCOSE, UA: NEGATIVE
SL AMB POCT URINE PROTEIN: NEGATIVE

## 2024-06-04 PROCEDURE — 81002 URINALYSIS NONAUTO W/O SCOPE: CPT | Performed by: OBSTETRICS & GYNECOLOGY

## 2024-06-04 PROCEDURE — PNV: Performed by: OBSTETRICS & GYNECOLOGY

## 2024-06-04 PROCEDURE — 87150 DNA/RNA AMPLIFIED PROBE: CPT | Performed by: OBSTETRICS & GYNECOLOGY

## 2024-06-04 RX ORDER — ASCORBIC ACID 500 MG
500 TABLET ORAL DAILY
COMMUNITY

## 2024-06-04 NOTE — PROGRESS NOTES
Routine Prenatal Visit  [facility] - [place]  [address], [place] [address]    Assessment/Plan:  Jaimee is a 33 y.o. year old  at 36w5d who presents for routine prenatal visit.     1. 36 weeks gestation of pregnancy  Assessment & Plan:  - Labor/Bleeding/ROM precautions given. Kick counts reviewed  - GBS swab collected today.  - Delivery consent reviewed and signed today. Risks of delivery reviewed, including bleeding, infection, damage to surrounding organs/structures (specifically bowel, bladder, vessels, nerves, ureters), need for operative vaginal delivery or  delivery, hysterectomy, need for blood transfusion, need for further surgery.   - Problem list updated, results console reviewed and updated with pertinent prenatal labs.  - PMH, PSH, medications reviewed and updated as needed  - Return to office in 1 wk(s) for routine prenatal care   Orders:  -     POCT urine dip  2.  screening for streptococcus B  -     Strep B DNA probe, amplification  3. Maternal care due to low transverse uterine scar from previous  delivery  Assessment & Plan:  Has repeat c section scheduled  4. Other obesity due to excess calories affecting pregnancy in third trimester  5. Marginal insertion of umbilical cord affecting management of mother        Subjective:   Jaimee Bowman is a 33 y.o.  who presents for routine prenatal care at 36w5d.  Complaints today: Denies  LOF: -; VB: -; Contractions: -; FM: +    Objective:  /70   Wt 105 kg (231 lb)   LMP 2023 (Exact Date)   BMI 37.28 kg/m²     General: Well appearing, no distress  Respiratory: Unlabored breathing  Abdomen: Soft, gravid, nontender  Extremities: Warm and well perfused.  Non tender.    Pregravid Weight/BMI: 95.3 kg (210 lb) (BMI 33.91)  Current Weight: 105 kg (231 lb)   Total Weight Gain: 9.526 kg (21 lb)     Pre-Gabe Vitals      Flowsheet Row Most Recent Value   Prenatal Assessment    Fetal Heart Rate 154    Fundal Height (cm) 36 cm   Movement Present   Presentation Vertex   Prenatal Vitals    Blood Pressure 116/70   Weight - Scale 105 kg (231 lb)   Urine Albumin/Glucose    Dilation/Effacement/Station    Cervical Dilation 0   Cervical Effacement 0   Vaginal Drainage    Edema              Roula Wilburn DO  6/4/2024 9:25 AM

## 2024-06-04 NOTE — ASSESSMENT & PLAN NOTE
Has repeat c section scheduled   In responding to this request, please exercise your independent professional judgment.  The fact that a question is asked does not imply that any particular answer is desired or expected.

## 2024-06-06 LAB — GP B STREP DNA SPEC QL NAA+PROBE: NEGATIVE

## 2024-06-12 ENCOUNTER — ROUTINE PRENATAL (OUTPATIENT)
Dept: OBGYN CLINIC | Facility: CLINIC | Age: 34
End: 2024-06-12
Payer: COMMERCIAL

## 2024-06-12 VITALS
WEIGHT: 231 LBS | HEIGHT: 66 IN | SYSTOLIC BLOOD PRESSURE: 128 MMHG | DIASTOLIC BLOOD PRESSURE: 74 MMHG | BODY MASS INDEX: 37.12 KG/M2

## 2024-06-12 DIAGNOSIS — O99.213 OTHER OBESITY DUE TO EXCESS CALORIES AFFECTING PREGNANCY IN THIRD TRIMESTER: ICD-10-CM

## 2024-06-12 DIAGNOSIS — E66.09 OTHER OBESITY DUE TO EXCESS CALORIES AFFECTING PREGNANCY IN THIRD TRIMESTER: ICD-10-CM

## 2024-06-12 DIAGNOSIS — O43.199 MARGINAL INSERTION OF UMBILICAL CORD AFFECTING MANAGEMENT OF MOTHER: ICD-10-CM

## 2024-06-12 DIAGNOSIS — Z3A.37 37 WEEKS GESTATION OF PREGNANCY: ICD-10-CM

## 2024-06-12 DIAGNOSIS — D50.8 IRON DEFICIENCY ANEMIA SECONDARY TO INADEQUATE DIETARY IRON INTAKE: ICD-10-CM

## 2024-06-12 DIAGNOSIS — O26.893 RH NEGATIVE STATUS DURING PREGNANCY IN THIRD TRIMESTER: ICD-10-CM

## 2024-06-12 DIAGNOSIS — O34.211 MATERNAL CARE DUE TO LOW TRANSVERSE UTERINE SCAR FROM PREVIOUS CESAREAN DELIVERY: Primary | ICD-10-CM

## 2024-06-12 DIAGNOSIS — D69.6 THROMBOCYTOPENIA AFFECTING PREGNANCY (HCC): ICD-10-CM

## 2024-06-12 DIAGNOSIS — Z67.91 RH NEGATIVE STATUS DURING PREGNANCY IN THIRD TRIMESTER: ICD-10-CM

## 2024-06-12 DIAGNOSIS — O99.119 THROMBOCYTOPENIA AFFECTING PREGNANCY (HCC): ICD-10-CM

## 2024-06-12 LAB
SL AMB  POCT GLUCOSE, UA: NORMAL
SL AMB POCT URINE PROTEIN: NORMAL

## 2024-06-12 PROCEDURE — PNV: Performed by: STUDENT IN AN ORGANIZED HEALTH CARE EDUCATION/TRAINING PROGRAM

## 2024-06-12 PROCEDURE — 81002 URINALYSIS NONAUTO W/O SCOPE: CPT | Performed by: STUDENT IN AN ORGANIZED HEALTH CARE EDUCATION/TRAINING PROGRAM

## 2024-06-12 NOTE — PROGRESS NOTES
"Routine Prenatal Visit  [facility] - [address], [address], [place] [address]    Assessment/Plan:  Jaimee is a 33 y.o. year old  at 37w6d who presents for routine prenatal visit.     1. Maternal care due to low transverse uterine scar from previous  delivery  Assessment & Plan:  - Plans repeat  delivery, which is scheduled.   2. Other obesity due to excess calories affecting pregnancy in third trimester  3. Thrombocytopenia affecting pregnancy (HCC)  4. Rh negative status during pregnancy in third trimester  5. Marginal insertion of umbilical cord affecting management of mother  6. 37 weeks gestation of pregnancy  Assessment & Plan:  - Labor/Bleeding/ROM precautions given. Kick counts reviewed.  - GBS negative result reviewed.   - Problem list updated, results console reviewed and updated with pertinent prenatal labs.  - PMH, PSH, medications reviewed and updated as needed  - Return to office in 1 wk(s) for routine prenatal care    Orders:  -     POCT urine dip  7. Iron deficiency anemia secondary to inadequate dietary iron intake  Assessment & Plan:  - Continue oral iron supplementation.         Subjective:   Jaimee Bowman is a 33 y.o.  who presents for routine prenatal care at 37w6d.  Complaints today: No  LOF: No; VB: No; Contractions: No; FM: Present and normal    Objective:  /74 (BP Location: Left arm, Patient Position: Sitting, Cuff Size: Standard)   Ht 5' 6\" (1.676 m)   Wt 105 kg (231 lb)   LMP 2023 (Exact Date)   BMI 37.28 kg/m²     General: Well appearing, no distress  Respiratory: Unlabored breathing  Cardiovascular: Regular rate.  Abdomen: Soft, gravid, nontender  Extremities: Warm and well perfused.  Non tender.    Pregravid Weight/BMI: 95.3 kg (210 lb) (BMI 33.91)  Current Weight: 105 kg (231 lb)   Total Weight Gain: 9.526 kg (21 lb)     Pre- Vitals      Flowsheet Row Most Recent Value   Prenatal Assessment    Fetal Heart Rate " 155   Fundal Height (cm) 38 cm   Movement Present   Presentation Vertex   Prenatal Vitals    Blood Pressure 128/74   Weight - Scale 105 kg (231 lb)   Urine Albumin/Glucose    Dilation/Effacement/Station    Vaginal Drainage    Draining Fluid No   Edema    LLE Edema None   RLE Edema None   Facial Edema None             Jacobo Shirley MD  6/12/2024 1:35 PM

## 2024-06-12 NOTE — ASSESSMENT & PLAN NOTE
- Labor/Bleeding/ROM precautions given. Kick counts reviewed.  - GBS negative result reviewed.   - Problem list updated, results console reviewed and updated with pertinent prenatal labs.  - PMH, PSH, medications reviewed and updated as needed  - Return to office in 1 wk(s) for routine prenatal care

## 2024-06-19 ENCOUNTER — ROUTINE PRENATAL (OUTPATIENT)
Dept: OBGYN CLINIC | Facility: CLINIC | Age: 34
End: 2024-06-19
Payer: COMMERCIAL

## 2024-06-19 VITALS
WEIGHT: 229.2 LBS | DIASTOLIC BLOOD PRESSURE: 74 MMHG | HEIGHT: 66 IN | BODY MASS INDEX: 36.83 KG/M2 | SYSTOLIC BLOOD PRESSURE: 126 MMHG

## 2024-06-19 DIAGNOSIS — O43.199 MARGINAL INSERTION OF UMBILICAL CORD AFFECTING MANAGEMENT OF MOTHER: ICD-10-CM

## 2024-06-19 DIAGNOSIS — O26.893 RH NEGATIVE STATUS DURING PREGNANCY IN THIRD TRIMESTER: ICD-10-CM

## 2024-06-19 DIAGNOSIS — O34.211 MATERNAL CARE DUE TO LOW TRANSVERSE UTERINE SCAR FROM PREVIOUS CESAREAN DELIVERY: Primary | ICD-10-CM

## 2024-06-19 DIAGNOSIS — Z67.91 RH NEGATIVE STATUS DURING PREGNANCY IN THIRD TRIMESTER: ICD-10-CM

## 2024-06-19 DIAGNOSIS — Z3A.38 38 WEEKS GESTATION OF PREGNANCY: ICD-10-CM

## 2024-06-19 LAB
SL AMB  POCT GLUCOSE, UA: NORMAL
SL AMB POCT URINE PROTEIN: NORMAL

## 2024-06-19 PROCEDURE — PNV: Performed by: OBSTETRICS & GYNECOLOGY

## 2024-06-19 PROCEDURE — 81002 URINALYSIS NONAUTO W/O SCOPE: CPT | Performed by: OBSTETRICS & GYNECOLOGY

## 2024-06-19 NOTE — PROGRESS NOTES
"Routine Prenatal Visit  [facility] [address], [address], [place] [address]    Assessment/Plan:  Jaimee is a 33 y.o. year old  at 38w6d who presents for routine prenatal visit.     1. Maternal care due to low transverse uterine scar from previous  delivery  Assessment & Plan:  Reviewed C/S. Has instructions and chlorhexidine.  2. Marginal insertion of umbilical cord affecting management of mother  3. Rh negative status during pregnancy in third trimester  4. 38 weeks gestation of pregnancy  -     POCT urine dip        Subjective:     CC: Prenatal care    Jaimee Bowman is a 33 y.o.  female who presents for routine prenatal care at 38w6d.  Pregnancy ROS: no leakage of fluid, pelvic pain, or vaginal bleeding.  Normal   fetal movement.    The following portions of the patient's history were reviewed and updated as appropriate: allergies, current medications, past family history, past medical history, obstetric history, gynecologic history, past social history, past surgical history and problem list.      Objective:  /74 (BP Location: Left arm, Patient Position: Sitting, Cuff Size: Standard)   Ht 5' 6\" (1.676 m)   Wt 104 kg (229 lb 3.2 oz)   LMP 2023 (Exact Date)   BMI 36.99 kg/m²   Pregravid Weight/BMI: 95.3 kg (210 lb) (BMI 33.91)  Current Weight: 104 kg (229 lb 3.2 oz)   Total Weight Gain: 8.709 kg (19 lb 3.2 oz)   Pre- Vitals      Flowsheet Row Most Recent Value   Prenatal Assessment    Fetal Heart Rate 155   Fundal Height (cm) 39 cm   Movement Present   Presentation Vertex   Prenatal Vitals    Blood Pressure 126/74   Weight - Scale 104 kg (229 lb 3.2 oz)   Urine Albumin/Glucose    Dilation/Effacement/Station    Vaginal Drainage    Edema              General: Well appearing, no distress  Respiratory: Unlabored breathing  Cardiovascular: Regular rate.  Abdomen: Soft, gravid, nontender  Fundal Height: Appropriate for gestational age.  Extremities: " Warm and well perfused.  Non tender.

## 2024-06-21 ENCOUNTER — NURSE TRIAGE (OUTPATIENT)
Dept: OTHER | Facility: OTHER | Age: 34
End: 2024-06-21

## 2024-06-21 ENCOUNTER — HOSPITAL ENCOUNTER (OUTPATIENT)
Facility: HOSPITAL | Age: 34
Discharge: HOME HEALTH | End: 2024-06-21
Attending: OBSTETRICS & GYNECOLOGY | Admitting: OBSTETRICS & GYNECOLOGY
Payer: COMMERCIAL

## 2024-06-21 VITALS
HEART RATE: 108 BPM | DIASTOLIC BLOOD PRESSURE: 79 MMHG | WEIGHT: 229 LBS | BODY MASS INDEX: 36.8 KG/M2 | RESPIRATION RATE: 16 BRPM | SYSTOLIC BLOOD PRESSURE: 124 MMHG | HEIGHT: 66 IN | TEMPERATURE: 98.5 F | OXYGEN SATURATION: 98 %

## 2024-06-21 PROBLEM — O47.9 UTERINE CONTRACTIONS: Status: ACTIVE | Noted: 2024-06-21

## 2024-06-21 PROCEDURE — NC001 PR NO CHARGE: Performed by: OBSTETRICS & GYNECOLOGY

## 2024-06-21 PROCEDURE — 99213 OFFICE O/P EST LOW 20 MIN: CPT

## 2024-06-21 NOTE — TELEPHONE ENCOUNTER
"Regardin weeks / labor  ----- Message from Portia PARKER sent at 2024  2:47 AM EDT -----  \"I am  39 weeks I think I may be in labor.\"    "

## 2024-06-21 NOTE — PROCEDURES
NST  Baseline 140  Variability moderate  accels yes  decels no  Ctx- no    Reactive NST    Pt has appt for fu   Currently has no complaintsJaimee Bowman, billy  at 39w1d with an IRISH of 2024, by Last Menstrual Period, was seen at [facility] for the following procedure(s):  ]

## 2024-06-21 NOTE — PROGRESS NOTES
Triage Note - OB  Jaimee Bowman 33 y.o. female MRN: [id number]  Unit/Bed#: LD TRIAGE 1 Encounter: [id number]    Chief Complaint:     SUBJECTIVE    HPI: 33 y.o.  at 39w1d with c/o contractions since yesterday and getting more intense overnight.   Spotting prior to arrival. History of c/s and would like to TOLAC.     Neg  loss of fluid   Pos  fetal movement      OBJECTIVE  Previous delivery(s) were .    Complications of pregnancy:   Patient Active Problem List   Diagnosis    Maternal care due to low transverse uterine scar from previous  delivery    History of ectopic pregnancy    37 weeks gestation of pregnancy    Obesity affecting pregnancy in third trimester    BMI 34.0-34.9,adult    Thrombocytopenia affecting pregnancy (HCC)    Rh negative status during pregnancy    Paresthesia of skin    H/O macrosomia in infant in prior pregnancy, currently pregnant, second trimester    COVID-19 affecting pregnancy in first trimester    Marginal insertion of umbilical cord affecting management of mother    Iron deficiency anemia secondary to inadequate dietary iron intake       GBS: neg  Blood type: AB neg    Estimated Date of Delivery: 24    Vitals: VSS  Vitals:    24 0448   BP: 124/79   Pulse: (!) 108   Resp: 16   Temp: 98.5 °F (36.9 °C)   SpO2: 98%     Body mass index is 36.96 kg/m².      FHR: 150s, reactive  [place]: q 3 m    Evaluate for Labor >34 wks  5:30 SVE: 1/50/-3 w/nurse chaperone in room  7:30 SVE: 1/50/-3 w/nurse chaperone in room  Labs:   No visits with results within 1 Day(s) from this visit.   Latest known visit with results is:   Routine Prenatal on 2024   Component Date Value    POCT URINE PROTEIN 2024 neg     GLUCOSE, UA 2024 neg        A/P  33 y.o. female  at 39w1d with c/o CTX. H/O prior section and would like to TOLAC.    No cervical change x 2 exams.     Reviewed options of discharge and continuing with plan to TOLAC vs electing for repeat  . She is considering.     Reyna Corrales MD   OB-GYN  2024 5:37 AM

## 2024-06-21 NOTE — H&P
OB H&P  Jaimee Bowman  1990  LD TRIAGE  TRIAGE 1*          Assessment/Plan:   at 39 weeks.     Contractions   No change in cervix since arrival at 5 AM with Dr. Corrales  I assumed care of patient at 8 AM and patient by then stated that ctx decreased and she desired discharge,   Uterine scar from prior c section   Discussed with patient that if ctx increase/strengthen/become more regular, she needs  To return due to prior scar and low risk of rupture   Is scheduled for c/s in 3 days  Reassuring fetal tracing  Marginal insertion cord   Fetal tracing reassuring        33 y.o. yo  at 39 weeks by us and lmp    Chief complaint:  contractions    HPI:  Pt presents with contractions  since early AM.        Pregnancy Complications:  Patient Active Problem List   Diagnosis    Maternal care due to low transverse uterine scar from previous  delivery    History of ectopic pregnancy    37 weeks gestation of pregnancy    Obesity affecting pregnancy in third trimester    BMI 34.0-34.9,adult    Thrombocytopenia affecting pregnancy (HCC)    Rh negative status during pregnancy    Paresthesia of skin    H/O macrosomia in infant in prior pregnancy, currently pregnant, second trimester    COVID-19 affecting pregnancy in first trimester    Marginal insertion of umbilical cord affecting management of mother    Iron deficiency anemia secondary to inadequate dietary iron intake         Past Medical History:  Past Medical History:   Diagnosis Date    Ectopic pregnancy         History of ectopic pregnancy 10/2022    treated with Methotrexate    Migraine     Obesity     Sleep apnea in adult     Varicella     as child       Past Surgical History:  Past Surgical History:   Procedure Laterality Date     SECTION  10/8/2016    MD TX MISSED  FIRST TRIMESTER SURGICAL N/A 2023    Procedure: DILATATION AND EVACUATION (D&E) (8WEEKS);  Surgeon: Mary Cordero MD;  Location:  MAIN OR;  Service:  Gynecology    WISDOM TOOTH EXTRACTION         Social Hx:  Social History     Socioeconomic History    Marital status: /Civil Union     Spouse name: Not on file    Number of children: Not on file    Years of education: Not on file    Highest education level: Not on file   Occupational History    Not on file   Tobacco Use    Smoking status: Former     Current packs/day: 0.00     Average packs/day: 1 pack/day for 5.7 years (5.7 ttl pk-yrs)     Types: Cigarettes     Start date: 2007     Quit date: 2013     Years since quittin.2     Passive exposure: Never    Smokeless tobacco: Never    Tobacco comments:     Quit 10 years ago   Vaping Use    Vaping status: Never Used   Substance and Sexual Activity    Alcohol use: Not Currently     Alcohol/week: 1.0 standard drink of alcohol     Types: 1 Cans of beer per week    Drug use: Never    Sexual activity: Yes     Partners: Male     Birth control/protection: None   Other Topics Concern    Not on file   Social History Narrative    Not on file     Social Determinants of Health     Financial Resource Strain: Not on file   Food Insecurity: No Food Insecurity (2024)    Hunger Vital Sign     Worried About Running Out of Food in the Last Year: Never true     Ran Out of Food in the Last Year: Never true   Transportation Needs: No Transportation Needs (2024)    PRAPARE - Transportation     Lack of Transportation (Medical): No     Lack of Transportation (Non-Medical): No   Physical Activity: Not on file   Stress: Not on file   Social Connections: Not on file   Intimate Partner Violence: Not on file   Housing Stability: Low Risk  (2024)    Housing Stability Vital Sign     Unable to Pay for Housing in the Last Year: No     Number of Times Moved in the Last Year: 0     Homeless in the Last Year: No       Meds:  No current facility-administered medications on file prior to encounter.     Current Outpatient Medications on File Prior to Encounter   Medication  "Sig Dispense Refill    cholecalciferol (VITAMIN D3) 400 units tablet Take by mouth daily      cyanocobalamin (VITAMIN B-12) 100 mcg tablet Take by mouth daily      Prenatal Vit-Fe Fumarate-FA (PRENATAL 1+1 PO) Take by mouth         Allergies:  No Known Allergies        RoS/    Constitutional: Negative    CV: Negative    Pulm: Negative    GI: Negative    Urinary: Negative    Neuro: Negative    Musculoskeletal: Negative  Obstetric History:    G$ P 1021  1 prior FT c/section for arrest of descent while pushing  8 lb 14 oz    Labs:  Strep Grp B PCR   Date Value Ref Range Status   06/04/2024 Negative Negative Final     Type & Screen:  ABO Grouping   Date Value Ref Range Status   04/09/2024 AB  Final      Rh Factor   Date Value Ref Range Status   04/09/2024 Negative  Final     Rh Type   Date Value Ref Range Status   04/25/2023 Negative  Final     Comment:     Please note: Prior records for this patient's ABO / Rh type are not  available for additional verification.      No results found for: \"ANTIBODYSCR\"    Specimen Expiration Date   Date Value Ref Range Status   04/09/2024 20240412  Final     No results found for: \"HIVAGAB\"  Hepatitis B Surface Ag   Date Value Ref Range Status   12/14/2023 Non-reactive Non-Reactive Final     No results found for: \"RPR\"  Rubella IgG Quant   Date Value Ref Range Status   12/14/2023 32.7 >14.9 IU/mL Final     Glucose   Date Value Ref Range Status   04/09/2024 123 70 - 134 mg/dL Final     Comment:     <=134 Normal   135-179 Impaired glucose fasting. Perform 3 Hour Glucose Tolerance   >=180 Diagnosis Gestational Diabetes Mellitus           Physical Exam:  Vital signs:    Vitals:    06/21/24 0448   BP: 124/79   Pulse: (!) 108   Resp: 16   Temp: 98.5 °F (36.9 °C)   SpO2: 98%        Alert, comfortable, no acute distress      Neuro:        Alert, appropriate affect, no gross deficits        Normal speech        CN2-12 intact and symmetric        DTR 3+ and symmetric        Muscle strength 5/5 " and symmetric    Regular rate and rhythm    Clear to auscultation bilaterally    Abdomen soft, nontender, nondistended.    Fundus nontender, size consistent with dates      Cephalic    Cervix: per Dr. Corrales 1.5 cm long thick  FHR: cat 1  CTXN: irreg

## 2024-06-21 NOTE — TELEPHONE ENCOUNTER
"Reason for Disposition  • [1] History of prior delivery (multipara) AND [2] contractions < 10 minutes apart AND [3] present 1 hour    Answer Assessment - Initial Assessment Questions  1. ONSET: \"When did the symptoms begin?\"         Today, as night progresses they are increasing in pain, 2 hours ago stronger and more painful  2. CONTRACTIONS: \"Describe the contractions that you are having.\" (e.g., duration, frequency, regularity, severity)      4 minutes apart, lasting 30-45 seconds  3. IRISH: \"What date are you expecting to deliver?\"      6/27/24  4. PARITY: \"Have you had a baby before?\" If Yes, ask: \"How long did the labor last?\"      G2  5. FETAL MOVEMENT: \"Has the baby's movement decreased or changed significantly from normal?\"      Fetal movement same as usual  6. OTHER SYMPTOMS: \"Do you have any other symptoms?\" (e.g., leaking fluid from vagina, vaginal bleeding, fever, hand/facial swelling)      Bloody show    Protocols used: Pregnancy - Labor-ADULT-AH    "

## 2024-06-21 NOTE — TELEPHONE ENCOUNTER
Messaged on-call provider regarding patient symptoms. Provider recommends to go to L&D. Patient agreeable. [facility] L&D Charge RN made aware of patient arrival.

## 2024-06-22 ENCOUNTER — NURSE TRIAGE (OUTPATIENT)
Dept: OTHER | Facility: OTHER | Age: 34
End: 2024-06-22

## 2024-06-22 NOTE — TELEPHONE ENCOUNTER
39W2D  IRISH , C-sec on   . Contractions Q7 for the last two hours. Also reports constant moderate headache since 0, however has not attempted Tylenol. No BP device at home. Baseline FM. No additional symptoms.     On call provider contacted and informed of patient's concerns and status.  Provider advises as follow:    Tylenol and hydration. If no improvement then to give us a call. Monitor until contractions are every 4-5 minutes and so strong it takes breath away      Informed of provider's recommendation, along with care advice. Verbalized understanding. Agreeable with disposition. No further questions.

## 2024-06-22 NOTE — TELEPHONE ENCOUNTER
"Reason for Disposition   [1] History of prior delivery (multipara) AND [2] contractions < 10 minutes apart AND [3] present 1 hour    Answer Assessment - Initial Assessment Questions  1. LOCATION: \"Where does it hurt?\"       Top head, forehead   2. ONSET: \"When did the headache start?\" (Minutes, hours or days)       Onset 0300  3. PATTERN: \"Does the pain come and go, or has it been constant since it started?\"      Constant   4. SEVERITY: \"How bad is the pain?\" and \"What does it keep you from doing?\"     - MILD - doesn't interfere with normal activities     - MODERATE - interferes with normal activities or awakens from sleep     - SEVERE - excruciating pain, unable to do any normal activities         5/10  5. RECURRENT SYMPTOM: \"Have you ever had headaches before?\" If Yes, ask: \"When was the last time?\" and \"What happened that time?\"      Denies   6. CAUSE: \"What do you think is causing the headache?\"      Unsure   7. MIGRAINE: \"Have you been diagnosed with migraine headaches?\" If Yes, ask: \"Is this headache similar?\"       Denies   8. HEAD INJURY: \"Has there been any recent injury to the head?\"       Denies   9. OTHER SYMPTOMS: \"Do you have any other symptoms?\" (e.g., fever, stiff neck, blurred vision; swelling of hands, face, or feet)      Contractions Q7 mins for the last two hours    10. PREGNANCY: \"How many weeks pregnant are you?\"        39W2D  11. IRISH: \"What date are you expecting to deliver?\"  , C-sec on       Protocols used: Pregnancy - Headache-ADULT-AH, Pregnancy - Labor-ADULT-AH    "

## 2024-06-23 ENCOUNTER — ANESTHESIA EVENT (INPATIENT)
Dept: LABOR AND DELIVERY | Facility: HOSPITAL | Age: 34
End: 2024-06-23
Payer: COMMERCIAL

## 2024-06-23 ENCOUNTER — NURSE TRIAGE (OUTPATIENT)
Dept: OTHER | Facility: OTHER | Age: 34
End: 2024-06-23

## 2024-06-23 NOTE — TELEPHONE ENCOUNTER
"Regarding:  appt time  ----- Message from Toni LUCERO sent at 2024  4:22 PM EDT -----  \"I am scheduled for a  tomorrow but they have not called me with the time of the appt.\"    "

## 2024-06-23 NOTE — TELEPHONE ENCOUNTER
Patient inquiring what time to arrive for scheduled C section tomorrow.    [facility] Charge nurse advised will call patient to go over time and details.  Pt advised and awaits call.

## 2024-06-24 ENCOUNTER — HOSPITAL ENCOUNTER (INPATIENT)
Facility: HOSPITAL | Age: 34
LOS: 2 days | Discharge: HOME | End: 2024-06-26
Attending: STUDENT IN AN ORGANIZED HEALTH CARE EDUCATION/TRAINING PROGRAM | Admitting: STUDENT IN AN ORGANIZED HEALTH CARE EDUCATION/TRAINING PROGRAM
Payer: COMMERCIAL

## 2024-06-24 ENCOUNTER — ANESTHESIA (INPATIENT)
Dept: LABOR AND DELIVERY | Facility: HOSPITAL | Age: 34
End: 2024-06-24
Payer: COMMERCIAL

## 2024-06-24 DIAGNOSIS — O34.211 MATERNAL CARE DUE TO LOW TRANSVERSE UTERINE SCAR FROM PREVIOUS CESAREAN DELIVERY: Primary | ICD-10-CM

## 2024-06-24 DIAGNOSIS — O99.213 OTHER OBESITY DUE TO EXCESS CALORIES AFFECTING PREGNANCY IN THIRD TRIMESTER: ICD-10-CM

## 2024-06-24 DIAGNOSIS — Z3A.39 39 WEEKS GESTATION OF PREGNANCY: ICD-10-CM

## 2024-06-24 DIAGNOSIS — E66.09 OTHER OBESITY DUE TO EXCESS CALORIES AFFECTING PREGNANCY IN THIRD TRIMESTER: ICD-10-CM

## 2024-06-24 PROBLEM — K21.9 GASTROESOPHAGEAL REFLUX DISEASE: Status: ACTIVE | Noted: 2024-06-24

## 2024-06-24 LAB
ABO GROUP BLD: NORMAL
BASE EXCESS BLDCOA CALC-SCNC: 0 MMOL/L (ref 3–11)
BASE EXCESS BLDCOV CALC-SCNC: -0.1 MMOL/L (ref 1–9)
BLD GP AB SCN SERPL QL: NEGATIVE
ERYTHROCYTE [DISTWIDTH] IN BLOOD BY AUTOMATED COUNT: 14.9 % (ref 11.6–15.1)
ERYTHROCYTE [DISTWIDTH] IN BLOOD BY AUTOMATED COUNT: 15.1 % (ref 11.6–15.1)
FIBRINOGEN PPP-MCNC: 460 MG/DL (ref 207–520)
HCO3 BLDCOA-SCNC: 28.4 MMOL/L (ref 17.3–27.3)
HCO3 BLDCOV-SCNC: 26.6 MMOL/L (ref 12.2–28.6)
HCT VFR BLD AUTO: 33.4 % (ref 34.8–46.1)
HCT VFR BLD AUTO: 34.8 % (ref 34.8–46.1)
HGB BLD-MCNC: 10.9 G/DL (ref 11.5–15.4)
HGB BLD-MCNC: 11.6 G/DL (ref 11.5–15.4)
HOLD SPECIMEN: NORMAL
MCH RBC QN AUTO: 28.3 PG (ref 26.8–34.3)
MCH RBC QN AUTO: 28.7 PG (ref 26.8–34.3)
MCHC RBC AUTO-ENTMCNC: 32.6 G/DL (ref 31.4–37.4)
MCHC RBC AUTO-ENTMCNC: 33.3 G/DL (ref 31.4–37.4)
MCV RBC AUTO: 86 FL (ref 82–98)
MCV RBC AUTO: 87 FL (ref 82–98)
O2 CT VFR BLDCOA CALC: 4.1 ML/DL
OXYHGB MFR BLDCOA: 18.9 %
OXYHGB MFR BLDCOV: 60 %
PCO2 BLDCOA: 61.9 MM[HG] (ref 30–60)
PCO2 BLDCOV: 51.1 MM HG (ref 27–43)
PH BLDCOA: 7.28 [PH] (ref 7.23–7.43)
PH BLDCOV: 7.33 [PH] (ref 7.19–7.49)
PLATELET # BLD AUTO: 147 THOUSANDS/UL (ref 149–390)
PLATELET # BLD AUTO: 176 THOUSANDS/UL (ref 149–390)
PMV BLD AUTO: 10.3 FL (ref 8.9–12.7)
PMV BLD AUTO: 10.4 FL (ref 8.9–12.7)
PO2 BLDCOA: 11.1 MM HG (ref 5–25)
PO2 BLDCOV: 24.1 MM HG (ref 15–45)
RBC # BLD AUTO: 3.85 MILLION/UL (ref 3.81–5.12)
RBC # BLD AUTO: 4.04 MILLION/UL (ref 3.81–5.12)
RH BLD: NEGATIVE
SAO2 % BLDCOV: 12.7 ML/DL
SPECIMEN EXPIRATION DATE: NORMAL
TREPONEMA PALLIDUM IGG+IGM AB [PRESENCE] IN SERUM OR PLASMA BY IMMUNOASSAY: NORMAL
WBC # BLD AUTO: 13.39 THOUSAND/UL (ref 4.31–10.16)
WBC # BLD AUTO: 8.47 THOUSAND/UL (ref 4.31–10.16)

## 2024-06-24 PROCEDURE — 4A1HXCZ MONITORING OF PRODUCTS OF CONCEPTION, CARDIAC RATE, EXTERNAL APPROACH: ICD-10-PCS | Performed by: STUDENT IN AN ORGANIZED HEALTH CARE EDUCATION/TRAINING PROGRAM

## 2024-06-24 PROCEDURE — 82805 BLOOD GASES W/O2 SATURATION: CPT | Performed by: STUDENT IN AN ORGANIZED HEALTH CARE EDUCATION/TRAINING PROGRAM

## 2024-06-24 PROCEDURE — 86780 TREPONEMA PALLIDUM: CPT | Performed by: STUDENT IN AN ORGANIZED HEALTH CARE EDUCATION/TRAINING PROGRAM

## 2024-06-24 PROCEDURE — 85027 COMPLETE CBC AUTOMATED: CPT | Performed by: STUDENT IN AN ORGANIZED HEALTH CARE EDUCATION/TRAINING PROGRAM

## 2024-06-24 PROCEDURE — 86850 RBC ANTIBODY SCREEN: CPT | Performed by: STUDENT IN AN ORGANIZED HEALTH CARE EDUCATION/TRAINING PROGRAM

## 2024-06-24 PROCEDURE — 88307 TISSUE EXAM BY PATHOLOGIST: CPT | Performed by: PATHOLOGY

## 2024-06-24 PROCEDURE — 86901 BLOOD TYPING SEROLOGIC RH(D): CPT | Performed by: STUDENT IN AN ORGANIZED HEALTH CARE EDUCATION/TRAINING PROGRAM

## 2024-06-24 PROCEDURE — 85027 COMPLETE CBC AUTOMATED: CPT | Performed by: OBSTETRICS & GYNECOLOGY

## 2024-06-24 PROCEDURE — 85384 FIBRINOGEN ACTIVITY: CPT | Performed by: OBSTETRICS & GYNECOLOGY

## 2024-06-24 PROCEDURE — NC001 PR NO CHARGE: Performed by: STUDENT IN AN ORGANIZED HEALTH CARE EDUCATION/TRAINING PROGRAM

## 2024-06-24 PROCEDURE — 59510 CESAREAN DELIVERY: CPT | Performed by: STUDENT IN AN ORGANIZED HEALTH CARE EDUCATION/TRAINING PROGRAM

## 2024-06-24 PROCEDURE — 86900 BLOOD TYPING SEROLOGIC ABO: CPT | Performed by: STUDENT IN AN ORGANIZED HEALTH CARE EDUCATION/TRAINING PROGRAM

## 2024-06-24 RX ORDER — KETOROLAC TROMETHAMINE 30 MG/ML
30 INJECTION, SOLUTION INTRAMUSCULAR; INTRAVENOUS EVERY 6 HOURS SCHEDULED
Status: COMPLETED | OUTPATIENT
Start: 2024-06-24 | End: 2024-06-25

## 2024-06-24 RX ORDER — ONDANSETRON 2 MG/ML
4 INJECTION INTRAMUSCULAR; INTRAVENOUS EVERY 6 HOURS PRN
Status: DISPENSED | OUTPATIENT
Start: 2024-06-24 | End: 2024-06-25

## 2024-06-24 RX ORDER — OXYTOCIN/RINGER'S LACTATE 30/500 ML
62.5 PLASTIC BAG, INJECTION (ML) INTRAVENOUS ONCE
Status: COMPLETED | OUTPATIENT
Start: 2024-06-24 | End: 2024-06-25

## 2024-06-24 RX ORDER — METHYLERGONOVINE MALEATE 0.2 MG/ML
0.2 INJECTION INTRAVENOUS ONCE
Status: COMPLETED | OUTPATIENT
Start: 2024-06-24 | End: 2024-06-24

## 2024-06-24 RX ORDER — DIPHENHYDRAMINE HCL 25 MG
25 TABLET ORAL EVERY 6 HOURS PRN
Status: DISCONTINUED | OUTPATIENT
Start: 2024-06-24 | End: 2024-06-26 | Stop reason: HOSPADM

## 2024-06-24 RX ORDER — MISOPROSTOL 200 UG/1
1000 TABLET ORAL ONCE
Status: COMPLETED | OUTPATIENT
Start: 2024-06-24 | End: 2024-06-24

## 2024-06-24 RX ORDER — FENTANYL CITRATE 50 UG/ML
INJECTION, SOLUTION INTRAMUSCULAR; INTRAVENOUS AS NEEDED
Status: DISCONTINUED | OUTPATIENT
Start: 2024-06-24 | End: 2024-06-24

## 2024-06-24 RX ORDER — CALCIUM CARBONATE 500 MG/1
1000 TABLET, CHEWABLE ORAL DAILY PRN
Status: DISCONTINUED | OUTPATIENT
Start: 2024-06-24 | End: 2024-06-26 | Stop reason: HOSPADM

## 2024-06-24 RX ORDER — SODIUM CHLORIDE, SODIUM LACTATE, POTASSIUM CHLORIDE, CALCIUM CHLORIDE 600; 310; 30; 20 MG/100ML; MG/100ML; MG/100ML; MG/100ML
INJECTION, SOLUTION INTRAVENOUS CONTINUOUS PRN
Status: DISCONTINUED | OUTPATIENT
Start: 2024-06-24 | End: 2024-06-24

## 2024-06-24 RX ORDER — ENOXAPARIN SODIUM 100 MG/ML
40 INJECTION SUBCUTANEOUS DAILY
Status: DISCONTINUED | OUTPATIENT
Start: 2024-06-25 | End: 2024-06-26 | Stop reason: HOSPADM

## 2024-06-24 RX ORDER — ONDANSETRON 2 MG/ML
INJECTION INTRAMUSCULAR; INTRAVENOUS AS NEEDED
Status: DISCONTINUED | OUTPATIENT
Start: 2024-06-24 | End: 2024-06-24

## 2024-06-24 RX ORDER — CEFAZOLIN SODIUM 2 G/50ML
2000 SOLUTION INTRAVENOUS ONCE
Status: COMPLETED | OUTPATIENT
Start: 2024-06-24 | End: 2024-06-24

## 2024-06-24 RX ORDER — ACETAMINOPHEN 325 MG/1
650 TABLET ORAL EVERY 6 HOURS SCHEDULED
Status: DISCONTINUED | OUTPATIENT
Start: 2024-06-24 | End: 2024-06-26 | Stop reason: HOSPADM

## 2024-06-24 RX ORDER — METOCLOPRAMIDE HYDROCHLORIDE 5 MG/ML
5 INJECTION INTRAMUSCULAR; INTRAVENOUS EVERY 6 HOURS PRN
Status: ACTIVE | OUTPATIENT
Start: 2024-06-24 | End: 2024-06-25

## 2024-06-24 RX ORDER — OXYTOCIN/RINGER'S LACTATE 30/500 ML
PLASTIC BAG, INJECTION (ML) INTRAVENOUS CONTINUOUS PRN
Status: DISCONTINUED | OUTPATIENT
Start: 2024-06-24 | End: 2024-06-24

## 2024-06-24 RX ORDER — NALOXONE HYDROCHLORIDE 0.4 MG/ML
0.1 INJECTION, SOLUTION INTRAMUSCULAR; INTRAVENOUS; SUBCUTANEOUS
Status: ACTIVE | OUTPATIENT
Start: 2024-06-24 | End: 2024-06-25

## 2024-06-24 RX ORDER — BUPIVACAINE HYDROCHLORIDE 7.5 MG/ML
INJECTION, SOLUTION INTRASPINAL AS NEEDED
Status: DISCONTINUED | OUTPATIENT
Start: 2024-06-24 | End: 2024-06-24

## 2024-06-24 RX ORDER — TRANEXAMIC ACID 10 MG/ML
1000 INJECTION, SOLUTION INTRAVENOUS ONCE
Status: COMPLETED | OUTPATIENT
Start: 2024-06-24 | End: 2024-06-24

## 2024-06-24 RX ORDER — ONDANSETRON 2 MG/ML
4 INJECTION INTRAMUSCULAR; INTRAVENOUS ONCE AS NEEDED
Status: DISCONTINUED | OUTPATIENT
Start: 2024-06-24 | End: 2024-06-26 | Stop reason: HOSPADM

## 2024-06-24 RX ORDER — MORPHINE SULFATE 0.5 MG/ML
INJECTION, SOLUTION EPIDURAL; INTRATHECAL; INTRAVENOUS AS NEEDED
Status: DISCONTINUED | OUTPATIENT
Start: 2024-06-24 | End: 2024-06-24

## 2024-06-24 RX ORDER — IBUPROFEN 600 MG/1
600 TABLET ORAL EVERY 6 HOURS
Status: DISCONTINUED | OUTPATIENT
Start: 2024-06-26 | End: 2024-06-26 | Stop reason: HOSPADM

## 2024-06-24 RX ORDER — DOCUSATE SODIUM 100 MG/1
100 CAPSULE, LIQUID FILLED ORAL 2 TIMES DAILY PRN
Status: DISCONTINUED | OUTPATIENT
Start: 2024-06-24 | End: 2024-06-26 | Stop reason: HOSPADM

## 2024-06-24 RX ORDER — DEXAMETHASONE SODIUM PHOSPHATE 10 MG/ML
INJECTION, SOLUTION INTRAMUSCULAR; INTRAVENOUS AS NEEDED
Status: DISCONTINUED | OUTPATIENT
Start: 2024-06-24 | End: 2024-06-24

## 2024-06-24 RX ORDER — HYDROMORPHONE HCL IN WATER/PF 6 MG/30 ML
0.2 PATIENT CONTROLLED ANALGESIA SYRINGE INTRAVENOUS
Status: DISCONTINUED | OUTPATIENT
Start: 2024-06-24 | End: 2024-06-26 | Stop reason: HOSPADM

## 2024-06-24 RX ORDER — OXYCODONE HYDROCHLORIDE 5 MG/1
5 TABLET ORAL EVERY 4 HOURS PRN
Status: DISCONTINUED | OUTPATIENT
Start: 2024-06-25 | End: 2024-06-26 | Stop reason: HOSPADM

## 2024-06-24 RX ORDER — FENTANYL CITRATE/PF 50 MCG/ML
50 SYRINGE (ML) INJECTION
Status: DISCONTINUED | OUTPATIENT
Start: 2024-06-24 | End: 2024-06-26 | Stop reason: HOSPADM

## 2024-06-24 RX ORDER — CITRIC ACID/SODIUM CITRATE 334-500MG
30 SOLUTION, ORAL ORAL ONCE
Status: COMPLETED | OUTPATIENT
Start: 2024-06-24 | End: 2024-06-24

## 2024-06-24 RX ORDER — OXYTOCIN/RINGER'S LACTATE 30/500 ML
PLASTIC BAG, INJECTION (ML) INTRAVENOUS
Status: COMPLETED
Start: 2024-06-24 | End: 2024-06-24

## 2024-06-24 RX ORDER — CALCIUM CARBONATE 500 MG/1
1000 TABLET, CHEWABLE ORAL 3 TIMES DAILY PRN
Status: DISCONTINUED | OUTPATIENT
Start: 2024-06-24 | End: 2024-06-26 | Stop reason: HOSPADM

## 2024-06-24 RX ORDER — NALBUPHINE HYDROCHLORIDE 10 MG/ML
5 INJECTION, SOLUTION INTRAMUSCULAR; INTRAVENOUS; SUBCUTANEOUS
Status: ACTIVE | OUTPATIENT
Start: 2024-06-24 | End: 2024-06-25

## 2024-06-24 RX ORDER — ONDANSETRON 2 MG/ML
4 INJECTION INTRAMUSCULAR; INTRAVENOUS EVERY 8 HOURS PRN
Status: DISCONTINUED | OUTPATIENT
Start: 2024-06-25 | End: 2024-06-26 | Stop reason: HOSPADM

## 2024-06-24 RX ORDER — KETOROLAC TROMETHAMINE 30 MG/ML
INJECTION, SOLUTION INTRAMUSCULAR; INTRAVENOUS AS NEEDED
Status: DISCONTINUED | OUTPATIENT
Start: 2024-06-24 | End: 2024-06-24

## 2024-06-24 RX ORDER — SODIUM CHLORIDE, SODIUM LACTATE, POTASSIUM CHLORIDE, CALCIUM CHLORIDE 600; 310; 30; 20 MG/100ML; MG/100ML; MG/100ML; MG/100ML
125 INJECTION, SOLUTION INTRAVENOUS CONTINUOUS
Status: DISCONTINUED | OUTPATIENT
Start: 2024-06-24 | End: 2024-06-26 | Stop reason: HOSPADM

## 2024-06-24 RX ORDER — HYDROMORPHONE HCL/PF 1 MG/ML
0.5 SYRINGE (ML) INJECTION EVERY 2 HOUR PRN
Status: DISCONTINUED | OUTPATIENT
Start: 2024-06-24 | End: 2024-06-26 | Stop reason: HOSPADM

## 2024-06-24 RX ADMIN — CEFAZOLIN SODIUM 2000 MG: 2 SOLUTION INTRAVENOUS at 07:16

## 2024-06-24 RX ADMIN — CALCIUM CARBONATE (ANTACID) CHEW TAB 500 MG 1000 MG: 500 CHEW TAB at 23:21

## 2024-06-24 RX ADMIN — Medication 62.5 MILLI-UNITS/MIN: at 11:26

## 2024-06-24 RX ADMIN — SODIUM CHLORIDE, SODIUM LACTATE, POTASSIUM CHLORIDE, AND CALCIUM CHLORIDE: .6; .31; .03; .02 INJECTION, SOLUTION INTRAVENOUS at 08:08

## 2024-06-24 RX ADMIN — Medication 1 TABLET: at 12:31

## 2024-06-24 RX ADMIN — ONDANSETRON 4 MG: 2 INJECTION INTRAMUSCULAR; INTRAVENOUS at 08:35

## 2024-06-24 RX ADMIN — PHENYLEPHRINE HYDROCHLORIDE 40 MCG/MIN: 10 INJECTION, SOLUTION INTRAVENOUS at 08:17

## 2024-06-24 RX ADMIN — SODIUM CHLORIDE, SODIUM LACTATE, POTASSIUM CHLORIDE, AND CALCIUM CHLORIDE 1000 ML: .6; .31; .03; .02 INJECTION, SOLUTION INTRAVENOUS at 06:32

## 2024-06-24 RX ADMIN — METHYLERGONOVINE MALEATE 0.2 MG: 0.2 INJECTION INTRAVENOUS at 10:52

## 2024-06-24 RX ADMIN — MORPHINE SULFATE 0.15 MG: 0.5 INJECTION EPIDURAL; INTRATHECAL; INTRAVENOUS at 08:16

## 2024-06-24 RX ADMIN — Medication 62.5 MILLI-UNITS/MIN: at 10:01

## 2024-06-24 RX ADMIN — Medication 250 MILLI-UNITS/MIN: at 08:42

## 2024-06-24 RX ADMIN — FENTANYL CITRATE 15 MCG: 50 INJECTION, SOLUTION INTRAMUSCULAR; INTRAVENOUS at 08:16

## 2024-06-24 RX ADMIN — KETOROLAC TROMETHAMINE 30 MG: 30 INJECTION, SOLUTION INTRAMUSCULAR; INTRAVENOUS at 15:14

## 2024-06-24 RX ADMIN — SODIUM CITRATE AND CITRIC ACID MONOHYDRATE 30 ML: 500; 334 SOLUTION ORAL at 06:30

## 2024-06-24 RX ADMIN — ACETAMINOPHEN 650 MG: 325 TABLET, FILM COATED ORAL at 19:10

## 2024-06-24 RX ADMIN — Medication 62.5 MILLI-UNITS/MIN: at 15:51

## 2024-06-24 RX ADMIN — BUPIVACAINE HYDROCHLORIDE IN DEXTROSE 1.5 ML: 7.5 INJECTION, SOLUTION SUBARACHNOID at 08:16

## 2024-06-24 RX ADMIN — ACETAMINOPHEN 650 MG: 325 TABLET, FILM COATED ORAL at 12:31

## 2024-06-24 RX ADMIN — MISOPROSTOL 1000 MCG: 200 TABLET ORAL at 15:50

## 2024-06-24 RX ADMIN — KETOROLAC TROMETHAMINE 30 MG: 30 INJECTION, SOLUTION INTRAMUSCULAR; INTRAVENOUS at 22:11

## 2024-06-24 RX ADMIN — TRANEXAMIC ACID 1000 MG: 10 INJECTION, SOLUTION INTRAVENOUS at 15:49

## 2024-06-24 RX ADMIN — KETOROLAC TROMETHAMINE 30 MG: 30 INJECTION, SOLUTION INTRAMUSCULAR; INTRAVENOUS at 09:09

## 2024-06-24 RX ADMIN — DEXAMETHASONE SODIUM PHOSPHATE 10 MG: 10 INJECTION INTRAMUSCULAR; INTRAVENOUS at 08:35

## 2024-06-24 RX ADMIN — SODIUM CHLORIDE, SODIUM LACTATE, POTASSIUM CHLORIDE, AND CALCIUM CHLORIDE 125 ML/HR: .6; .31; .03; .02 INJECTION, SOLUTION INTRAVENOUS at 10:00

## 2024-06-24 NOTE — OP NOTE
OPERATIVE REPORT  PATIENT NAME: Jaimee Bowman    :  1990  MRN: [id number]  Pt Location: [facility] L&D OR ROOM 02    SURGERY DATE: 2024    Surgeons and Role:     * Jacobo Shirley MD - Primary     * Wilfrido Yun MD - Assisting    Pre-op Diagnosis: Black pregnancy at 39w4d in vertex presentation    Post-op Diagnosis: Same, delivered    Procedure: Repeat low-transverse  section via Pfannenstiel skin incision    Specimen(s):  ID Type Source Tests Collected by Time Destination   1 :  Tissue (Placenta on Hold) OB Only Placenta TISSUE EXAM OB (PLACENTA) ONLY Jacobo Shirley MD 2024 0850    A :  Cord Blood Cord BLOOD GAS, ARTERIAL, CORD Jacobo Shirley MD 2024 0741    B :  Cord Blood Cord BLOOD GAS, VENOUS, CORD Jacobo Shirley MD 2024 0741        Quantitative blood loss: 169 mL    Drains:  Urethral Catheter Double-lumen 16 Fr. (Active)   Output (mL) 50 mL 24 0945   Number of days: 0     Anesthesia Type: Spinal    Operative Indications:   Jaimee Bowman is a 33 y.o.  at 39w4d for repeat  section. The patient declined trial of labor after  section. All risks, benefits, and alternatives were discussed with the patient. All questions were answered. The patient agreed to proceed with surgery.    Teo Group Classification System:  No Multiple pregnancy, No Transverse or oblique lie, No Breech lie, Gestational age is > or =37 weeks, Multiparous, Previous uterine scar +  is TEO GROUP 5    Operative Findings:  1. Live infant delivered from vertex position through meconium stained fluid at 08:41, weight 3740 grams, Apgar scores of  8 and 9 at 1 and 5 minutes, respectively. Loose nuchal cord x 1.  2. Intact placenta delivered via fundal massage, 3-vessel cord noted.  3. Thin adhesions of the bladder to the lower uterine segment requiring bladder flap prior to hysterotomy. Omental adhesions to mid-body of the uterus.   4. Normal appearing bilateral  fallopian tubes and ovaries.    Complications: None    Procedure and Technique:    The patient was taken to the operating room, where she was placed under spinal anesthesia. The patient was then placed in supine position with a leftward tilt. She was prepped and draped in the normal sterile fashion. When anesthesia was found to be adequate, a Pfannenstiel skin incision was made with a scalpel and carried down to the underlying layer of fascia. The fascia was then incised with a scalpel and extended laterally and superiorly with curved Barragan scissors. The superior portion of the fascial incision was then grasped with two Kocher clamps, elevated, and  from the underlying rectus muscles with sharp and blunt dissection. Attention was then turned to the inferior aspect of the fascial incision, which in a similar manner, was grasped with two Kocher clamps, elevated, and  from the underlying rectus muscles using sharp and blunt dissection. The rectus muscles were then  in the midline, and the peritoneum was entered bluntly. The peritoneal incision was then extended superiorly, inferiorly, and laterally using blunt dissection and electrocautery with excellent visualization of the bladder. When adequate exposure had been achieved, the bladder blade was then placed. The vesicouterine peritoneum was identified and tented upwards with smooth pickups. The vesicouterine peritoneum as entered sharply with Metzenbaum scissors and the incision extended laterally and superiorly with the Metzenbaum scissors. A bladder flap was then created digitally. The bladder blade was then replaced.     A low transverse uterine incision was then made with a scalpel. The uterus was entered bluntly and the hysterotomy was extended bluntly in a cephalad-caudad manner. Amniotomy was performed. The infant was then delivered in vertex presentation. After delayed cord clamping for 30 seconds, the cord was clamped and cut, and  the infant was handed off to awaiting Neonatologist. Cord blood was collected and the placenta was delivered with fundal massage noted to be intact with 3-vessel cord.     The uterus was then exteriorized and cleared of all clots and debris. The hysterotomy was then closed with 0-Vicryl suture in a running locked fashion. A second layer of the same suture was used in a imbricating fashion in order to obtain excellent hemostasis. Three figure-of-eight stitches of 2-0 Vicryl suture were placed along the hysterotomy to obtain hemostasis. The uterus was then returned to the abdomen, and the gutters were cleared of all clots and debris. The hysterotomy was inspected and noted to be hemostatic. The fascia was then closed with 0 Vicryl suture in a running fashion. The deep subcutaneous tissue was then irrigated and closed with 2-0 plain gut suture in a running fashion. The skin was then closed with 4-0 Monocryl suture in subcuticular fashion.     The patient tolerated the procedure well. Sponge, lap, and needle counts were correct x 3. The patient was taken to the Recovery Room in stable condition.    I was present for the entire procedure.    Patient Disposition:  PACU     SIGNATURE: Jacobo Shirley MD  DATE: June 24, 2024  TIME: 10:08 AM

## 2024-06-24 NOTE — ANESTHESIA POSTPROCEDURE EVALUATION
Post-Op Assessment Note    CV Status:  Stable  Pain Score: 0    Pain management: adequate       Mental Status:  Alert and awake   Hydration Status:  Euvolemic   PONV Controlled:  Controlled   Airway Patency:  Patent     Post Op Vitals Reviewed: Yes    No anethesia notable event occurred.    Staff: CRNA               BP   114/72   Temp   97.5   Pulse  90   Resp   16   SpO2   95 room air

## 2024-06-24 NOTE — QUICK NOTE
"Called by RN for evaluation of bleeding, as small dark clots have been expressed with each fundal massage.     /69 (BP Location: Right arm)   Pulse 64   Temp (!) 97.4 °F (36.3 °C) (Oral)   Resp 20   Ht 5' 6\" (1.676 m)   Wt 104 kg (229 lb)   LMP 09/21/2023 (Exact Date)   SpO2 97%   Breastfeeding Unknown   BMI 36.96 kg/m²     Bimanual exam performed. Cervix approximately 3 cm dilated. Small amount of clot noted in DEANNA. Approximately 100 mL dark clot evacuated. Bleeding stable at this time.     Will administer methergine 0.2 mg IM x 1 to assist with continued hemostasis.     Jacobo Shirley MD  6/24/2024 10:53 AM    "

## 2024-06-24 NOTE — H&P
History & Physical - OB/GYN   Jaimee Bowman 33 y.o. female MRN: [id number]  Unit/Bed#: LD PACU-03 Encounter: [id number]    Assessment:   33 y.o.  at 39w4d admitted for repeat  section.    Plan:   1. Admit to L&D  2. Place IV and IV fluids  3. Labs: CBC, RPR, type and screen  4. Continue NPO  5. Fetal monitoring and toco  6. Anesthesia evaluation  7. Preop Antibiotics ordered    Maternal care due to low transverse uterine scar from previous  delivery  - Declines TOLAC. Will proceed with repeat  section, as scheduled.   - Prior to surgery, risks of surgery were reviewed. We specifically reviewed the risk of bleeding, infection, damage to surrounding organs/structures (specifically bowel, bladder, vessels, nerves, ureters), difficulty healing, scar tissue formation, hernia, need for hysterectomy, need for blood transfusion, and need for further surgery. We also reviewed implications for increased morbidity in subsequent pregnancy such as accreta or uterine rupture.         Rh negative status during pregnancy  - Plan for Rhogam postpartum.         _____________________    Chief complaint: Here for scheduled     She is a patient of [facility] [place].    HPI: Jaimee Bowman is 33 y.o.  at 39w4d presenting for scheduled repeat  delivery    Contractions:  no  Fetal movement:  yes  Vaginal bleeding:   no  Leaking of fluid:  no      Pregnancy Complications:  Pregnancy Problems (from 23 to present)       Problem Noted Resolved    Iron deficiency anemia secondary to inadequate dietary iron intake 4/15/2024 by Nga Jenkins DO No    Overview Signed 4/15/2024 12:36 PM by Nga Jenkins DO     Treat with oral iron supplement         Marginal insertion of umbilical cord affecting management of mother 2024 by Britany Rodriguez MD No    Overview Addendum 2024  1:15 PM by SABRINA Mejia     Marginal cord insertion at anatomy u/s - f/u  growth 32 weeks 5/10         Thrombocytopenia affecting pregnancy (HCC) 2023 by Jacobo Shirley MD No    Overview Addendum 2024 12:54 PM by SABRINA Mejia     Low platelets (148) noted on initial prenatal panel at 12 weeks. Patient was coincidentally +COVID at time of lab draw.  [x] Repeat CBC in 4 weeks.    1/15/2024: Plt 163   174         Rh negative status during pregnancy 2023 by Jacobo Shirley MD No    Overview Signed 2024  3:15 PM by SABRINA Mejia     Rhogam 2024         39 weeks gestation of pregnancy 2023 by Roxana Quispe PA-C No    Obesity affecting pregnancy in third trimester 2023 by Roxana Quispe PA-C No    Overview Addendum 2024  3:16 PM by SABRINA Mejia     Baseline b,mi 33.9    Early 1 hr normal  Traditional 1 hour gtt 123          Maternal care due to low transverse uterine scar from previous  delivery 2023 by Mary Cordero MD No    Overview Addendum 2024  3:14 PM by SABRINA Mejia     2016 pregnancy - Pushed for 2 hours.  Patient would like to TOLAC    24 Pt has reconsidered and would like a repeat LTCS  2024 Dr. Jenkins                 PMH:  Past Medical History:   Diagnosis Date    Ectopic pregnancy         History of ectopic pregnancy 10/2022    treated with Methotrexate    Migraine     Obesity     Sleep apnea in adult     Varicella     as child       PSH:  Past Surgical History:   Procedure Laterality Date     SECTION  10/8/2016    [place] TX MISSED  FIRST TRIMESTER SURGICAL N/A 2023    Procedure: DILATATION AND EVACUATION (D&E) (8WEEKS);  Surgeon: Mary Cordero MD;  Location:  MAIN OR;  Service: Gynecology    WISDOM TOOTH EXTRACTION         Social Hx:  Social History     Tobacco Use    Smoking status: Former     Current packs/day: 0.00     Average packs/day: 1 pack/day for 5.7 years (5.7 ttl pk-yrs)     Types: Cigarettes     Start date:  2007     Quit date: 2013     Years since quittin.2     Passive exposure: Never    Smokeless tobacco: Never    Tobacco comments:     Quit 10 years ago   Vaping Use    Vaping status: Never Used   Substance Use Topics    Alcohol use: Not Currently     Alcohol/week: 1.0 standard drink of alcohol     Types: 1 Cans of beer per week    Drug use: Never        OB Hx:  OB History    Para Term  AB Living   4 1 1 0 2 1   SAB IAB Ectopic Multiple Live Births   1 0 1 0 1      # Outcome Date GA Lbr Gabriel/2nd Weight Sex Type Anes PTL Lv   4 Current            3 SAB 23 7w0d    SAB         Birth Comments: no fetal heart tones, D&E   2 Ectopic 10/2022 5w0d    ECTOPIC         Birth Comments: treated with methotrexate   1 Term 10/08/16 41w0d  4026 g (8 lb 14 oz) M CS-LTranv EPI N THOMAS      Complications: Failure to Progress in Second Stage       Meds:  No current facility-administered medications on file prior to encounter.     Current Outpatient Medications on File Prior to Encounter   Medication Sig Dispense Refill    ascorbic acid (VITAMIN C) 500 mg tablet Take 500 mg by mouth daily      cholecalciferol (VITAMIN D3) 400 units tablet Take by mouth daily      cyanocobalamin (VITAMIN B-12) 100 mcg tablet Take by mouth daily      Ferrous Sulfate ER (Slow Fe) 142 (45 Fe) MG TBCR Take by mouth      Prenatal Vit-Fe Fumarate-FA (PRENATAL 1+1 PO) Take by mouth         Allergies:  No Known Allergies    Labs:  ABO Grouping   Date Value Ref Range Status   2024 AB  Final      Rh Factor   Date Value Ref Range Status   2024 Negative  Final     Rh Type   Date Value Ref Range Status   2023 Negative  Final     Comment:     Please note: Prior records for this patient's ABO / Rh type are not  available for additional verification.        Rh Factor   Date Value Ref Range Status   2024 Negative  Final     Rh Type   Date Value Ref Range Status   2023 Negative  Final     Comment:     Please note:  "Prior records for this patient's ABO / Rh type are not  available for additional verification.       No results found for: \"HIVAGAB\"  Hepatitis B Surface Ag   Date Value Ref Range Status   12/14/2023 Non-reactive Non-Reactive Final     Hepatitis C Ab   Date Value Ref Range Status   12/14/2023 Non-reactive Non-Reactive Final     No results found for: \"RPR\"  Rubella IgG Quant   Date Value Ref Range Status   12/14/2023 32.7 >14.9 IU/mL Final     Glucose   Date Value Ref Range Status   04/09/2024 123 70 - 134 mg/dL Final     Comment:     <=134 Normal   135-179 Impaired glucose fasting. Perform 3 Hour Glucose Tolerance   >=180 Diagnosis Gestational Diabetes Mellitus     No results found for: \"FGXDXPN5MG\"  Strep Grp B PCR   Date Value Ref Range Status   06/04/2024 Negative Negative Final       Physical Exam:  /84   Pulse 96   Temp 98.1 °F (36.7 °C) (Oral)   Resp 20   Ht 5' 6\" (1.676 m)   Wt 104 kg (229 lb)   LMP 09/21/2023 (Exact Date)   SpO2 97%   BMI 36.96 kg/m²     Gen: alert, no acute distress  Cardiac: regular rate  Resp: unlabored breathing  Abdomen: gravid, non-tender, non-distended  Extremities: non-tender, no edema    Estimated Fetal Weight: 8.0 lbs  Presentation: cephalic, confirmed via u/s    FHT:  Baseline Rate (FHR): 150 bpm  Variability: Moderate  Accelerations: 15 x 15 or greater  Decelerations: None  FHR Category: Category I  TOCO:        Membranes: Intact         Jacobo Shirley MD  6/24/2024 8:07 AM      "

## 2024-06-24 NOTE — ASSESSMENT & PLAN NOTE
- Declines TOLAC. Will proceed with repeat  section, as scheduled.   - Prior to surgery, risks of surgery were reviewed. We specifically reviewed the risk of bleeding, infection, damage to surrounding organs/structures (specifically bowel, bladder, vessels, nerves, ureters), difficulty healing, scar tissue formation, hernia, need for hysterectomy, need for blood transfusion, and need for further surgery. We also reviewed implications for increased morbidity in subsequent pregnancy such as accreta or uterine rupture.

## 2024-06-24 NOTE — PLAN OF CARE
Problem: BIRTH - VAGINAL/ SECTION  Goal: Fetal and maternal status remain reassuring during the birth process  Description: INTERVENTIONS:  - Monitor vital signs  - Monitor fetal heart rate  - Monitor uterine activity  - Monitor labor progression (vaginal delivery)  - DVT prophylaxis  - Antibiotic prophylaxis  Outcome: Progressing  Goal: Emotionally satisfying birthing experience for mother/fetus  Description: Interventions:  - Assess, plan, implement and evaluate the nursing care given to the patient in labor  - Advocate the philosophy that each childbirth experience is a unique experience and support the family's chosen level of involvement and control during the labor process   - Actively participate in both the patient's and family's teaching of the birth process  - Consider cultural, Christianity and age-specific factors and plan care for the patient in labor  Outcome: Progressing     Problem: PAIN - ADULT  Goal: Verbalizes/displays adequate comfort level or baseline comfort level  Description: Interventions:  - Encourage patient to monitor pain and request assistance  - Assess pain using appropriate pain scale  - Administer analgesics based on type and severity of pain and evaluate response  - Implement non-pharmacological measures as appropriate and evaluate response  - Consider cultural and social influences on pain and pain management  - Notify physician/advanced practitioner if interventions unsuccessful or patient reports new pain  Outcome: Progressing     Problem: INFECTION - ADULT  Goal: Absence or prevention of progression during hospitalization  Description: INTERVENTIONS:  - Assess and monitor for signs and symptoms of infection  - Monitor lab/diagnostic results  - Monitor all insertion sites, i.e. indwelling lines, tubes, and drains  - Monitor endotracheal if appropriate and nasal secretions for changes in amount and color  - [place] appropriate cooling/warming therapies per order  -  Administer medications as ordered  - Instruct and encourage patient and family to use good hand hygiene technique  - Identify and instruct in appropriate isolation precautions for identified infection/condition  Outcome: Progressing  Goal: Absence of fever/infection during neutropenic period  Description: INTERVENTIONS:  - Monitor WBC    Outcome: Progressing     Problem: SAFETY ADULT  Goal: Patient will remain free of falls  Description: INTERVENTIONS:  - Educate patient/family on patient safety including physical limitations  - Instruct patient to call for assistance with activity   - Consult OT/PT to assist with strengthening/mobility   - Keep Call bell within reach  - Keep bed low and locked with side rails adjusted as appropriate  - Keep care items and personal belongings within reach  - Initiate and maintain comfort rounds  - Make Fall Risk Sign visible to staff  - Apply yellow socks and bracelet for high fall risk patients  - Consider moving patient to room near nurses station  Outcome: Progressing  Goal: Maintain or return to baseline ADL function  Description: INTERVENTIONS:  -  Assess patient's ability to carry out ADLs; assess patient's baseline for ADL function and identify physical deficits which impact ability to perform ADLs (bathing, care of mouth/teeth, toileting, grooming, dressing, etc.)  - Assess/evaluate cause of self-care deficits   - Assess range of motion  - Assess patient's mobility; develop plan if impaired  - Assess patient's need for assistive devices and provide as appropriate  - Encourage maximum independence but intervene and supervise when necessary  - Involve family in performance of ADLs  - Assess for home care needs following discharge   - Consider OT consult to assist with ADL evaluation and planning for discharge  - Provide patient education as appropriate  Outcome: Progressing  Goal: Maintains/Returns to pre admission functional level  Description: INTERVENTIONS:  - Perform  AM-PAC 6 Click Basic Mobility/ Daily Activity assessment daily.  - Set and communicate daily mobility goal to care team and patient/family/caregiver.   - Collaborate with rehabilitation services on mobility goals if consulted  - Out of bed for toileting  - Record patient progress and toleration of activity level   Outcome: Progressing     Problem: Knowledge Deficit  Goal: Patient/family/caregiver demonstrates understanding of disease process, treatment plan, medications, and discharge instructions  Description: Complete learning assessment and assess knowledge base.  Interventions:  - Provide teaching at level of understanding  - Provide teaching via preferred learning methods  Outcome: Progressing     Problem: DISCHARGE PLANNING  Goal: Discharge to home or other facility with appropriate resources  Description: INTERVENTIONS:  - Identify barriers to discharge w/patient and caregiver  - Arrange for needed discharge resources and transportation as appropriate  - Identify discharge learning needs (meds, wound care, etc.)  - Arrange for interpretive services to assist at discharge as needed  - Refer to Case Management Department for coordinating discharge planning if the patient needs post-hospital services based on physician/advanced practitioner order or complex needs related to functional status, cognitive ability, or social support system  Outcome: Progressing

## 2024-06-24 NOTE — QUICK NOTE
Update:    RN called for continued bleeding after methergine.    Came to bedside.      137/89, HR 62  Alert comfortable NAD  ~100cc clot on chux, changed 5mins ago by RN  Cervix 3cm dilated, clots at cervix.  Uterus below umb, but somewhat boggy.  Responded to massage with good tone.    --> Misoprostol 1000mcg placed KS.  --> TXA 1gram IV x 1  --> Second bag postpartum oxytocin.  --> Check CBC and fibrinogen now.    Wilfrido Yun MD

## 2024-06-24 NOTE — ANESTHESIA PROCEDURE NOTES
Spinal Block    Patient location during procedure: OB/L&D  Start time: 6/24/2024 8:16 AM  Reason for block: at surgeon's request, post-op pain management and primary anesthetic  Staffing  Performed by: Dennise Mao CRNA  Authorized by: Tana Murillo MD    Preanesthetic Checklist  Completed: patient identified, IV checked, risks and benefits discussed, surgical consent, monitors and equipment checked, pre-op evaluation and timeout performed  Spinal Block  Patient position: sitting  Prep: ChloraPrep and site prepped and draped  Patient monitoring: continuous pulse ox, frequent blood pressure checks and heart rate  Approach: midline  Location: L3-4  Needle  Needle type: Pencan   Needle gauge: 25 G  Needle length: 3.5 in  Assessment  Injection Assessment:  negative aspiration for heme, no paresthesia on injection and positive aspiration for clear CSF.  Post-procedure:  site cleaned  Additional Notes  1 pass, easy placement

## 2024-06-24 NOTE — ANESTHESIA PREPROCEDURE EVALUATION
Procedure:   SECTION () REPEAT (Uterus)    Relevant Problems   ANESTHESIA (within normal limits)  Prior  under labor epidural      CARDIO (within normal limits)      GI/HEPATIC   (+) Gastroesophageal reflux disease (None currently)      GYN   (+) 39 weeks gestation of pregnancy      HEMATOLOGY   (+) Iron deficiency anemia secondary to inadequate dietary iron intake   (+) Thrombocytopenia affecting pregnancy (HCC)      NEURO/PSYCH   (+) Paresthesia of skin      PULMONARY (within normal limits)   (-) Sleep apnea   (-) Smoking   (-) URI (upper respiratory infection)    BMI 37    Physical Exam    Airway    Mallampati score: II  TM Distance: >3 FB  Neck ROM: full     Dental   No notable dental hx     Cardiovascular      Pulmonary      Other Findings  post-pubertal.    Lab Results   Component Value Date    WBC 8.47 2024    HGB 11.6 2024     (L) 2024     Lab Results   Component Value Date    SODIUM 137 10/10/2023    K 3.9 10/10/2023    BUN 10 10/10/2023    CREATININE 0.68 10/10/2023    EGFR 115 10/10/2023     Anesthesia Plan  ASA Score- 2     Anesthesia Type- spinal with ASA Monitors.         Additional Monitors:     Airway Plan:     Comment: Spinal with duramorph, backup GETA discussed.       Plan Factors-Exercise tolerance (METS): >4 METS.    Chart reviewed.   Existing labs reviewed. Patient summary reviewed.    Patient is not a current smoker.              Induction- intravenous.    Postoperative Plan-     Perioperative Resuscitation Plan - Level 1 - Full Code.       Informed Consent- Anesthetic plan and risks discussed with patient and spouse.  I personally reviewed this patient with the CRNA. Discussed and agreed on the Anesthesia Plan with the CRNA..

## 2024-06-25 LAB
ABO GROUP BLD: NORMAL
BLD GP AB SCN SERPL QL: NEGATIVE
ERYTHROCYTE [DISTWIDTH] IN BLOOD BY AUTOMATED COUNT: 15 % (ref 11.6–15.1)
FETAL CELL SCN BLD QL ROSETTE: NEGATIVE
HCT VFR BLD AUTO: 26.6 % (ref 34.8–46.1)
HGB BLD-MCNC: 8.9 G/DL (ref 11.5–15.4)
MCH RBC QN AUTO: 29.2 PG (ref 26.8–34.3)
MCHC RBC AUTO-ENTMCNC: 33.5 G/DL (ref 31.4–37.4)
MCV RBC AUTO: 87 FL (ref 82–98)
PLATELET # BLD AUTO: 135 THOUSANDS/UL (ref 149–390)
PMV BLD AUTO: 10.1 FL (ref 8.9–12.7)
RBC # BLD AUTO: 3.05 MILLION/UL (ref 3.81–5.12)
RH BLD: NEGATIVE
WBC # BLD AUTO: 11.88 THOUSAND/UL (ref 4.31–10.16)

## 2024-06-25 PROCEDURE — 86901 BLOOD TYPING SEROLOGIC RH(D): CPT | Performed by: STUDENT IN AN ORGANIZED HEALTH CARE EDUCATION/TRAINING PROGRAM

## 2024-06-25 PROCEDURE — 99024 POSTOP FOLLOW-UP VISIT: CPT | Performed by: OBSTETRICS & GYNECOLOGY

## 2024-06-25 PROCEDURE — 85027 COMPLETE CBC AUTOMATED: CPT | Performed by: OBSTETRICS & GYNECOLOGY

## 2024-06-25 PROCEDURE — 86850 RBC ANTIBODY SCREEN: CPT | Performed by: STUDENT IN AN ORGANIZED HEALTH CARE EDUCATION/TRAINING PROGRAM

## 2024-06-25 PROCEDURE — 85461 HEMOGLOBIN FETAL: CPT | Performed by: STUDENT IN AN ORGANIZED HEALTH CARE EDUCATION/TRAINING PROGRAM

## 2024-06-25 PROCEDURE — 86900 BLOOD TYPING SEROLOGIC ABO: CPT | Performed by: STUDENT IN AN ORGANIZED HEALTH CARE EDUCATION/TRAINING PROGRAM

## 2024-06-25 RX ADMIN — KETOROLAC TROMETHAMINE 30 MG: 30 INJECTION, SOLUTION INTRAMUSCULAR; INTRAVENOUS at 23:14

## 2024-06-25 RX ADMIN — ACETAMINOPHEN 650 MG: 325 TABLET, FILM COATED ORAL at 17:03

## 2024-06-25 RX ADMIN — KETOROLAC TROMETHAMINE 30 MG: 30 INJECTION, SOLUTION INTRAMUSCULAR; INTRAVENOUS at 11:13

## 2024-06-25 RX ADMIN — KETOROLAC TROMETHAMINE 30 MG: 30 INJECTION, SOLUTION INTRAMUSCULAR; INTRAVENOUS at 05:40

## 2024-06-25 RX ADMIN — ACETAMINOPHEN 650 MG: 325 TABLET, FILM COATED ORAL at 05:41

## 2024-06-25 RX ADMIN — DOCUSATE SODIUM 100 MG: 100 CAPSULE, LIQUID FILLED ORAL at 08:29

## 2024-06-25 RX ADMIN — KETOROLAC TROMETHAMINE 30 MG: 30 INJECTION, SOLUTION INTRAMUSCULAR; INTRAVENOUS at 17:03

## 2024-06-25 RX ADMIN — ACETAMINOPHEN 650 MG: 325 TABLET, FILM COATED ORAL at 00:10

## 2024-06-25 RX ADMIN — ENOXAPARIN SODIUM 40 MG: 40 INJECTION SUBCUTANEOUS at 08:30

## 2024-06-25 RX ADMIN — IRON SUCROSE 200 MG: 20 INJECTION, SOLUTION INTRAVENOUS at 17:02

## 2024-06-25 RX ADMIN — ACETAMINOPHEN 650 MG: 325 TABLET, FILM COATED ORAL at 11:13

## 2024-06-25 RX ADMIN — ACETAMINOPHEN 650 MG: 325 TABLET, FILM COATED ORAL at 23:27

## 2024-06-25 RX ADMIN — Medication 1 TABLET: at 08:29

## 2024-06-25 NOTE — PLAN OF CARE
Problem: PAIN - ADULT  Goal: Verbalizes/displays adequate comfort level or baseline comfort level  Description: Interventions:  - Encourage patient to monitor pain and request assistance  - Assess pain using appropriate pain scale  - Administer analgesics based on type and severity of pain and evaluate response  - Implement non-pharmacological measures as appropriate and evaluate response  - Consider cultural and social influences on pain and pain management  - Notify physician/advanced practitioner if interventions unsuccessful or patient reports new pain  Outcome: Progressing     Problem: INFECTION - ADULT  Goal: Absence or prevention of progression during hospitalization  Description: INTERVENTIONS:  - Assess and monitor for signs and symptoms of infection  - Monitor lab/diagnostic results  - Monitor all insertion sites, i.e. indwelling lines, tubes, and drains  - Monitor endotracheal if appropriate and nasal secretions for changes in amount and color  - [place] appropriate cooling/warming therapies per order  - Administer medications as ordered  - Instruct and encourage patient and family to use good hand hygiene technique  - Identify and instruct in appropriate isolation precautions for identified infection/condition  Outcome: Progressing  Goal: Absence of fever/infection during neutropenic period  Description: INTERVENTIONS:  - Monitor WBC    Outcome: Progressing     Problem: SAFETY ADULT  Goal: Patient will remain free of falls  Description: INTERVENTIONS:  - Educate patient/family on patient safety including physical limitations  - Instruct patient to call for assistance with activity   - Consult OT/PT to assist with strengthening/mobility   - Keep Call bell within reach  - Keep bed low and locked with side rails adjusted as appropriate  - Keep care items and personal belongings within reach  - Initiate and maintain comfort rounds  - Make Fall Risk Sign visible to staff  - Apply yellow socks and bracelet  for high fall risk patients  - Consider moving patient to room near nurses station  Outcome: Progressing  Goal: Maintain or return to baseline ADL function  Description: INTERVENTIONS:  -  Assess patient's ability to carry out ADLs; assess patient's baseline for ADL function and identify physical deficits which impact ability to perform ADLs (bathing, care of mouth/teeth, toileting, grooming, dressing, etc.)  - Assess/evaluate cause of self-care deficits   - Assess range of motion  - Assess patient's mobility; develop plan if impaired  - Assess patient's need for assistive devices and provide as appropriate  - Encourage maximum independence but intervene and supervise when necessary  - Involve family in performance of ADLs  - Assess for home care needs following discharge   - Consider OT consult to assist with ADL evaluation and planning for discharge  - Provide patient education as appropriate  Outcome: Progressing  Goal: Maintains/Returns to pre admission functional level  Description: INTERVENTIONS:  - Perform AM-PAC 6 Click Basic Mobility/ Daily Activity assessment daily.  - Set and communicate daily mobility goal to care team and patient/family/caregiver.   - Collaborate with rehabilitation services on mobility goals if consulted  - Out of bed for toileting  - Record patient progress and toleration of activity level   Outcome: Progressing     Problem: Knowledge Deficit  Goal: Patient/family/caregiver demonstrates understanding of disease process, treatment plan, medications, and discharge instructions  Description: Complete learning assessment and assess knowledge base.  Interventions:  - Provide teaching at level of understanding  - Provide teaching via preferred learning methods  Outcome: Progressing     Problem: DISCHARGE PLANNING  Goal: Discharge to home or other facility with appropriate resources  Description: INTERVENTIONS:  - Identify barriers to discharge w/patient and caregiver  - Arrange for needed  discharge resources and transportation as appropriate  - Identify discharge learning needs (meds, wound care, etc.)  - Arrange for interpretive services to assist at discharge as needed  - Refer to Case Management Department for coordinating discharge planning if the patient needs post-hospital services based on physician/advanced practitioner order or complex needs related to functional status, cognitive ability, or social support system  Outcome: Progressing     Problem: POSTPARTUM  Goal: Experiences normal postpartum course  Description: INTERVENTIONS:  - Monitor maternal vital signs  - Assess uterine involution and lochia  Outcome: Progressing  Goal: Appropriate maternal -  bonding  Description: INTERVENTIONS:  - Identify family support  - Assess for appropriate maternal/infant bonding   -Encourage maternal/infant bonding opportunities  - Referral to  or  as needed  Outcome: Progressing  Goal: Establishment of infant feeding pattern  Description: INTERVENTIONS:  - Assess breast/bottle feeding  - Refer to lactation as needed  Outcome: Progressing  Goal: Incision(s), wounds(s) or drain site(s) healing without S/S of infection  Description: INTERVENTIONS  - Assess and document dressing, incision, wound bed, drain sites and surrounding tissue  - Provide patient and family education  - Perform skin care/dressing changes every shift  Outcome: Progressing

## 2024-06-25 NOTE — LACTATION NOTE
This note was copied from a baby's chart.  CONSULT - LACTATION  Baby Boy (Joselo Bowman 1 days male MRN: [id number]    [facility] NURSERY Room / Bed: (N)/(N) Encounter: [id number]    Maternal Information     MOTHER:  Jaimee Bowman  Maternal Age: 33 y.o.  OB History: # 1 - Date: 10/08/16, Sex: Male, Weight: 4026 g (8 lb 14 oz), GA: 41w0d, Type: , Low Transverse, Apgar1: None, Apgar5: None, Living: Living, Birth Comments: None    # 2 - Date: 10/2022, Sex: None, Weight: None, GA: 5w0d, Type: Ectopic, Apgar1: None, Apgar5: None, Living: None, Birth Comments: treated with methotrexate    # 3 - Date: 23, Sex: None, Weight: None, GA: 7w0d, Type: Spontaneous , Apgar1: None, Apgar5: None, Living: None, Birth Comments: no fetal heart tones, D&E    # 4 - Date: 24, Sex: Male, Weight: 3740 g (8 lb 3.9 oz), GA: 39w4d, Type: , Low Transverse, Apgar1: 8, Apgar5: 9, Living: Living, Birth Comments: None   Previouse breast reduction surgery? No    Lactation history:   Has patient previously breast fed: Yes   How long had patient previously breast fed: 3 weeks   Previous breast feeding complications: Other (Comment) (baby with tongue tie)     Past Surgical History:   Procedure Laterality Date     SECTION  10/8/2016    [place]  DELIVERY ONLY N/A 2024    Procedure:  SECTION () REPEAT;  Surgeon: Jacobo Shirley MD;  Location: [facility];  Service: Obstetrics    [place] TX MISSED  FIRST TRIMESTER SURGICAL N/A 2023    Procedure: DILATATION AND EVACUATION (D&E) (8WEEKS);  Surgeon: Mary Cordero MD;  Location:  MAIN OR;  Service: Gynecology    WISDOM TOOTH EXTRACTION         Birth information:  YOB: 2025   Time of birth: 8:41 AM   Sex: male   Delivery type: , Low Transverse   Birth Weight: 3740 g (8 lb 3.9 oz)   Percent of Weight Change: -2%     Gestational Age: 39w4d    [unfilled]    Assessment     Breast and nipple assessment:  Denies need for help at this time     Assessment: sleepy    Feeding assessment: feeding well when awake as per Mom & staff    LATCH:  Latch: Grasps breast, tongue down, lips flanged, rhythmic sucking   Audible Swallowing: Spontaneous and intermittent (24 hours old)   Type of Nipple: Everted (After stimulation)   Comfort (Breast/Nipple): Soft/non-tender   Hold (Positioning): No assist from staff, mother able to position/hold infant   LATCH Score: 10          Feeding recommendations:  breast feed on demand    In to see dyad today. Mom sates she attempted for a few weeks with first child then stopped due to tongue tie BUT this baby has been doing well. Denies need for assistance OR supplies at this time.  Review of positioning and alignment.   Mom is encouraged to:     - Bring baby up to the breast (use of pillows to elevate so baby's torso is against mom's breasts)   - Skin to skin for feedings with top hand exposed to show signs of satiation   - Chin deep into breast tissue (make baby look up to the nipple)   - nose aligned to the nipple   -Wait for wide gape, drag chin on the breast so nipple is aimed at the upper, back palate  - Cheek should be touching breast   - Deep, firm hold of baby with ear, shoulder, hip alignment    Also reviewed Ready, Set Baby and discharge breastfeeding booklets including the feeding log. Emphasized 8 or more (12) feedings in a 24 hour period, what to expect for the number of diapers per day of life and the progression of properties of the  stooling pattern.    List of reasons to call a lactation consultant.  Feeding logs  Feeding cues  Hand expression  Baby's Second day (cluster feeding)  Breastfeeding and Your Lifestyle (Medications, Alcohol, Caffeine, Smoking, Street Drugs, Methadone)  First Two Weeks Survival Guide for Breastfeeding  Breast Changes  Physical Therapy  Storage and Handling of Breast  milk  How to Keep Your Breast Pump Kit Clean  The Employed Breastfeeding Mother  Mixed feeding  Bottle feeding like breastfeeding (paced bottle feeding)  astfeeding and your lifestyle, storage and preparation of breast milk, how to keep you breast pump clean, the employed breastfeeding mother and paced bottle feeding handouts.     Booklet included Breastfeeding Resources for after discharge including access to the number for the [facility]. No family support at bedside at this time.       [employer] M[facility]  to call for assistance, questions and concerns.  Extension number for inpatient lactation support provided.          Jeanna Santos RN 6/25/2024 11:25 AM

## 2024-06-25 NOTE — PROGRESS NOTES
"Progress Note - OB/GYN   Jaimee Bowman 33 y.o. female MRN: [id number]  Unit/Bed#: -01 Encounter: [id number]    Assessment:  Post partum Day #1 s/p RLTCS, stable, baby in room    Plan:  Acute Blood Loss Anemia) for venofer  S/P RLTCS ) Continue routine post partum care   Encourage ambulation   Encourage breastfeeding   Anticipate discharge tomorrow     Subjective/Objective   Chief Complaint:     Post delivery. Patient is doing well. Lochia WNL. Pain well controlled.     Subjective:     Pain: yes, cramping, improved with meds  Tolerating PO: yes  Voiding: yes  Flatus: yes  Ambulating: yes  Chest pain: no  Shortness of breath: no  Leg pain: no  Lochia: minimal    Objective:     Vitals: /85 (BP Location: Left arm)   Pulse 87   Temp (!) 97.4 °F (36.3 °C) (Temporal)   Resp 18   Ht 5' 6\" (1.676 m)   Wt 104 kg (229 lb)   LMP 09/21/2023 (Exact Date)   SpO2 100%   Breastfeeding Yes   BMI 36.96 kg/m²       Intake/Output Summary (Last 24 hours) at 6/25/2024 1621  Last data filed at 6/25/2024 0112  Gross per 24 hour   Intake --   Output 1325 ml   Net -1325 ml       Lab Results   Component Value Date    WBC 11.88 (H) 06/25/2024    HGB 8.9 (L) 06/25/2024    HCT 26.6 (L) 06/25/2024    MCV 87 06/25/2024     (L) 06/25/2024       Physical Exam:     Gen: AAOx3, NAD  No respiratory distress   Abd: Soft, non-tender, non-distended, no rebound or guarding  Incision - dry, mepilex in place  Ext: Non tender    Jessica Lewis MD  6/25/2024  4:21 PM          "

## 2024-06-25 NOTE — PLAN OF CARE
Problem: PAIN - ADULT  Goal: Verbalizes/displays adequate comfort level or baseline comfort level  Description: Interventions:  - Encourage patient to monitor pain and request assistance  - Assess pain using appropriate pain scale  - Administer analgesics based on type and severity of pain and evaluate response  - Implement non-pharmacological measures as appropriate and evaluate response  - Consider cultural and social influences on pain and pain management  - Notify physician/advanced practitioner if interventions unsuccessful or patient reports new pain  6/25/2024 0024 by Lina Fry RN  Outcome: Progressing  6/25/2024 0023 by Lian Fry RN  Outcome: Progressing     Problem: INFECTION - ADULT  Goal: Absence or prevention of progression during hospitalization  Description: INTERVENTIONS:  - Assess and monitor for signs and symptoms of infection  - Monitor lab/diagnostic results  - Monitor all insertion sites, i.e. indwelling lines, tubes, and drains  - Monitor endotracheal if appropriate and nasal secretions for changes in amount and color  - [place] appropriate cooling/warming therapies per order  - Administer medications as ordered  - Instruct and encourage patient and family to use good hand hygiene technique  - Identify and instruct in appropriate isolation precautions for identified infection/condition  6/25/2024 0024 by Lian Fry RN  Outcome: Progressing  6/25/2024 0023 by Lian Fry RN  Outcome: Progressing  Goal: Absence of fever/infection during neutropenic period  Description: INTERVENTIONS:  - Monitor WBC    6/25/2024 0024 by Lian Fry RN  Outcome: Progressing  6/25/2024 0023 by Lian Fry RN  Outcome: Progressing     Problem: SAFETY ADULT  Goal: Patient will remain free of falls  Description: INTERVENTIONS:  - Educate patient/family on patient safety including physical limitations  - Instruct patient to call for assistance with activity   - Consult OT/PT to assist with  strengthening/mobility   - Keep Call bell within reach  - Keep bed low and locked with side rails adjusted as appropriate  - Keep care items and personal belongings within reach  - Initiate and maintain comfort rounds  - Make Fall Risk Sign visible to staff  - Apply yellow socks and bracelet for high fall risk patients  - Consider moving patient to room near nurses station  6/25/2024 0024 by Lian Fry RN  Outcome: Progressing  6/25/2024 0023 by Lian Fry RN  Outcome: Progressing  Goal: Maintain or return to baseline ADL function  Description: INTERVENTIONS:  -  Assess patient's ability to carry out ADLs; assess patient's baseline for ADL function and identify physical deficits which impact ability to perform ADLs (bathing, care of mouth/teeth, toileting, grooming, dressing, etc.)  - Assess/evaluate cause of self-care deficits   - Assess range of motion  - Assess patient's mobility; develop plan if impaired  - Assess patient's need for assistive devices and provide as appropriate  - Encourage maximum independence but intervene and supervise when necessary  - Involve family in performance of ADLs  - Assess for home care needs following discharge   - Consider OT consult to assist with ADL evaluation and planning for discharge  - Provide patient education as appropriate  6/25/2024 0024 by Lian Fry RN  Outcome: Progressing  6/25/2024 0023 by Lian Fry RN  Outcome: Progressing  Goal: Maintains/Returns to pre admission functional level  Description: INTERVENTIONS:  - Perform AM-PAC 6 Click Basic Mobility/ Daily Activity assessment daily.  - Set and communicate daily mobility goal to care team and patient/family/caregiver.   - Collaborate with rehabilitation services on mobility goals if consulted  - Out of bed for toileting  - Record patient progress and toleration of activity level   6/25/2024 0024 by Lain Fry RN  Outcome: Progressing  6/25/2024 0023 by Lian Fry RN  Outcome: Progressing     Problem:  Knowledge Deficit  Goal: Patient/family/caregiver demonstrates understanding of disease process, treatment plan, medications, and discharge instructions  Description: Complete learning assessment and assess knowledge base.  Interventions:  - Provide teaching at level of understanding  - Provide teaching via preferred learning methods  2024 by Lian Fry RN  Outcome: Progressing  2024 by Lian Fry RN  Outcome: Progressing     Problem: DISCHARGE PLANNING  Goal: Discharge to home or other facility with appropriate resources  Description: INTERVENTIONS:  - Identify barriers to discharge w/patient and caregiver  - Arrange for needed discharge resources and transportation as appropriate  - Identify discharge learning needs (meds, wound care, etc.)  - Arrange for interpretive services to assist at discharge as needed  - Refer to Case Management Department for coordinating discharge planning if the patient needs post-hospital services based on physician/advanced practitioner order or complex needs related to functional status, cognitive ability, or social support system  2024 by Lian Fry RN  Outcome: Progressing  2024 by Lian Fry RN  Outcome: Progressing     Problem: POSTPARTUM  Goal: Experiences normal postpartum course  Description: INTERVENTIONS:  - Monitor maternal vital signs  - Assess uterine involution and lochia  Outcome: Progressing  Goal: Appropriate maternal -  bonding  Description: INTERVENTIONS:  - Identify family support  - Assess for appropriate maternal/infant bonding   -Encourage maternal/infant bonding opportunities  - Referral to  or  as needed  Outcome: Progressing  Goal: Establishment of infant feeding pattern  Description: INTERVENTIONS:  - Assess breast/bottle feeding  - Refer to lactation as needed  Outcome: Progressing  Goal: Incision(s), wounds(s) or drain site(s) healing without S/S of infection  Description:  INTERVENTIONS  - Assess and document dressing, incision, wound bed, drain sites and surrounding tissue  - Provide patient and family education  Outcome: Progressing

## 2024-06-26 ENCOUNTER — LACTATION ENCOUNTER (OUTPATIENT)
Dept: NURSERY | Facility: HOSPITAL | Age: 34
End: 2024-06-26

## 2024-06-26 VITALS
SYSTOLIC BLOOD PRESSURE: 126 MMHG | RESPIRATION RATE: 18 BRPM | OXYGEN SATURATION: 98 % | BODY MASS INDEX: 36.8 KG/M2 | DIASTOLIC BLOOD PRESSURE: 87 MMHG | HEART RATE: 84 BPM | TEMPERATURE: 97.6 F | HEIGHT: 66 IN | WEIGHT: 229 LBS

## 2024-06-26 PROBLEM — O43.199 MARGINAL INSERTION OF UMBILICAL CORD AFFECTING MANAGEMENT OF MOTHER: Status: RESOLVED | Noted: 2024-02-14 | Resolved: 2024-06-26

## 2024-06-26 PROBLEM — O09.292 H/O MACROSOMIA IN INFANT IN PRIOR PREGNANCY, CURRENTLY PREGNANT, SECOND TRIMESTER: Status: RESOLVED | Noted: 2024-02-13 | Resolved: 2024-06-26

## 2024-06-26 PROBLEM — O99.213 OBESITY AFFECTING PREGNANCY IN THIRD TRIMESTER: Status: RESOLVED | Noted: 2023-11-21 | Resolved: 2024-06-26

## 2024-06-26 PROCEDURE — 99024 POSTOP FOLLOW-UP VISIT: CPT | Performed by: OBSTETRICS & GYNECOLOGY

## 2024-06-26 PROCEDURE — 88307 TISSUE EXAM BY PATHOLOGIST: CPT | Performed by: PATHOLOGY

## 2024-06-26 PROCEDURE — NC001 PR NO CHARGE: Performed by: OBSTETRICS & GYNECOLOGY

## 2024-06-26 RX ORDER — IBUPROFEN 600 MG/1
600 TABLET ORAL EVERY 6 HOURS
Qty: 30 TABLET | Refills: 0 | Status: SHIPPED | OUTPATIENT
Start: 2024-06-26

## 2024-06-26 RX ORDER — DOCUSATE SODIUM 100 MG/1
100 CAPSULE, LIQUID FILLED ORAL 2 TIMES DAILY PRN
Qty: 30 CAPSULE | Refills: 0 | Status: SHIPPED | OUTPATIENT
Start: 2024-06-26

## 2024-06-26 RX ORDER — OXYCODONE HYDROCHLORIDE 5 MG/1
5 TABLET ORAL EVERY 4 HOURS PRN
Qty: 6 TABLET | Refills: 0 | Status: SHIPPED | OUTPATIENT
Start: 2024-06-26 | End: 2024-07-06

## 2024-06-26 RX ADMIN — ENOXAPARIN SODIUM 40 MG: 40 INJECTION SUBCUTANEOUS at 08:36

## 2024-06-26 RX ADMIN — DOCUSATE SODIUM 100 MG: 100 CAPSULE, LIQUID FILLED ORAL at 08:36

## 2024-06-26 RX ADMIN — ACETAMINOPHEN 650 MG: 325 TABLET, FILM COATED ORAL at 08:36

## 2024-06-26 RX ADMIN — IBUPROFEN 600 MG: 600 TABLET, FILM COATED ORAL at 10:30

## 2024-06-26 RX ADMIN — IBUPROFEN 600 MG: 600 TABLET, FILM COATED ORAL at 04:44

## 2024-06-26 RX ADMIN — Medication 1 TABLET: at 08:36

## 2024-06-26 NOTE — PLAN OF CARE
Problem: DISCHARGE PLANNING  Goal: Discharge to home or other facility with appropriate resources  Description: INTERVENTIONS:  - Identify barriers to discharge w/patient and caregiver  - Arrange for needed discharge resources and transportation as appropriate  - Identify discharge learning needs (meds, wound care, etc.)  - Arrange for interpretive services to assist at discharge as needed  - Refer to Case Management Department for coordinating discharge planning if the patient needs post-hospital services based on physician/advanced practitioner order or complex needs related to functional status, cognitive ability, or social support system  Outcome: Completed

## 2024-06-26 NOTE — NURSING NOTE
RN completed discharged education with patient. Reviewed safe sleep recommendations,  screenings, bath instructions, POST BIRTH Warning Signs magnet etc. Patient verbalized understanding and asked appropriate questions. Any and all questions answered by RN.

## 2024-06-26 NOTE — UTILIZATION REVIEW
"NOTIFICATION OF INPATIENT ADMISSION   MATERNITY/DELIVERY AUTHORIZATION REQUEST   SERVICING FACILITY:   [facility]  Parent Child Health - L&D, , NICU  [address] [place] [address]  Tax ID: [id number]  NPI: [id number] ATTENDING PROVIDER:  Attending Name and NPI#: Jacobo Shirley Md [[id number]]  Address: [address] [place] [address]  Phone: [phone]     ADMISSION INFORMATION:  Place of Service: Inpatient [facility]  Place of Service Code: 21  Inpatient Admission Date/Time: 24  6:09 AM  Discharge Date/Time: No discharge date for patient encounter.  Admitting Diagnosis Code/Description:  No admission diagnoses are documented for this encounter.     Mother: Jaimee Bowman 1990 Estimated Date of Delivery: 24  Delivering clinician: Jaocbo Shirley   OB History          4    Para   2    Term   2            AB   2    Living   2         SAB   1    IAB        Ectopic   1    Multiple   0    Live Births   2                Name & MRN:   Information for the patient's :  Cynthia Bowman (Jaimee) [[id number]]   [place] Delivery Information:  Sex: male  Delivered 2024 8:41 AM by , Low Transverse; Gestational Age: 39w4d    [place] Measurements:  Weight: 8 lb 3.9 oz (3740 g);  Height: 20.5\"    APGAR 1 minute 5 minutes 10 minutes   Totals: 8 9       UTILIZATION REVIEW CONTACT:  Arlene Benton, Utilization   Network Utilization Review Department  Phone: [phone]  Fax [phone]  Email: [email or web address]  Contact for approvals/pending authorizations, clinical reviews, and discharge.     PHYSICIAN ADVISORY SERVICES:  Medical Necessity Denial & Rjmc-xm-Nprf Review  Phone: [phone]  Fax: [phone]  Email: [email or web address]     DISCHARGE SUPPORT TEAM:  For Patients Discharge Needs & Updates  Phone: [phone] opt. 2 Fax: [phone]  Email: " [email or web address]

## 2024-06-26 NOTE — LACTATION NOTE
This note was copied from a baby's chart.    In to see this morning. Mom states plan is to supplement a little for a break.Discussed risks for early supplementation: over feeding, longer digestion times, engorgement for mom, lower milk supply for mom, and nipple confusion. Encouraged calling for latch assistance. Review of positioning and alignment. Mom is encouraged to:     - Bring baby up to the breast (use of pillows to elevate so baby's torso is against mom's breasts)   - Skin to skin for feedings with top hand exposed to show signs of satiation   - Chin deep into breast tissue (make baby look up to the nipple)   - nose aligned to the nipple   -Wait for wide gape, drag chin on the breast so nipple is aimed at the upper, back palate  - Cheek should be touching breast   - Deep, firm hold of baby with ear, shoulder, hip alignment    Benefits of breast feeding for infant's intestinal tract, less engorgement for mom, protection from multiple disease processes as infant develops, avoidance of over feeding for infant, less nipple confusion, and increased health benefits for mom.      Called back in at this time. Mom is right hand dominate and noted to be feeding baby on right breast using cross cradle hold. Belly up and piston sucks with some clicking noted. With permission helped break suction, reposition & latch. Worked on positioning infant up at chest level and starting to feed infant with nose arriving at the nipple. Then, using areolar compression to achieve a deep latch that is comfortable and exchanges optimum amounts of milk. After a few attempts was able to guide dyad to deeper latch. Stimulate to suck converting to rocker suckling. Mom then noted subtle difference with suckling and comfort at breast. Encouraged lactation support. Dad at bedside for discharge Home. Also shown signs of good latch/feed.      06/26/24 1000   Maternal Information   Has mother  before? Yes   How long did the the mother  previously breastfeed? 3 weeks   Previous Maternal Breastfeeding Challenges Other (Comment)  (latch; baby with tongue tie)   Infant to breast within first hour of birth? Yes   Exclusive Pump and Bottle Feed No   LATCH Documentation   Latch 2   Audible Swallowing 1   Type of Nipple 2   Comfort (Breast/Nipple) 2   Hold (Positioning) 1   LATCH Score 8   Having latch problems? No  (working on positioning & deep latch)   Breasts/Nipples   Right Breast Soft   Right Nipple Everted   Intervention Other (comment)  (review of positioning/latch & support available)   Breastfeeding Progress Not yet established   Reasons for not Breastfeeding Maternal preference  (Mom's plan is to supplement a bit to get a breask)   Breast Pump   Pump 2;1;3  (has [employer] [facility] Home)   Initiated by staff   Date Initiated 06/26/24   Patient Follow-Up   Lactation Consult Status 2   Follow-Up Type Inpatient;Call as needed   Other OB Lactation Documentation    Additional Problem Noted helped with positioning/maintaining deep latch  (has BOTH Booklets)     Encouraged parents to call for assistance, questions, and concerns about breastfeeding.  Extension provided.

## 2024-06-26 NOTE — PLAN OF CARE
Problem: PAIN - ADULT  Goal: Verbalizes/displays adequate comfort level or baseline comfort level  Description: Interventions:  - Encourage patient to monitor pain and request assistance  - Assess pain using appropriate pain scale  - Administer analgesics based on type and severity of pain and evaluate response  - Implement non-pharmacological measures as appropriate and evaluate response  - Consider cultural and social influences on pain and pain management  - Notify physician/advanced practitioner if interventions unsuccessful or patient reports new pain  Outcome: Adequate for Discharge     Problem: INFECTION - ADULT  Goal: Absence or prevention of progression during hospitalization  Description: INTERVENTIONS:  - Assess and monitor for signs and symptoms of infection  - Monitor lab/diagnostic results  - Monitor all insertion sites, i.e. indwelling lines, tubes, and drains  - Monitor endotracheal if appropriate and nasal secretions for changes in amount and color  - [place] appropriate cooling/warming therapies per order  - Administer medications as ordered  - Instruct and encourage patient and family to use good hand hygiene technique  - Identify and instruct in appropriate isolation precautions for identified infection/condition  Outcome: Adequate for Discharge  Goal: Absence of fever/infection during neutropenic period  Description: INTERVENTIONS:  - Monitor WBC    Outcome: Adequate for Discharge     Problem: SAFETY ADULT  Goal: Patient will remain free of falls  Description: INTERVENTIONS:  - Educate patient/family on patient safety including physical limitations  - Instruct patient to call for assistance with activity   - Consult OT/PT to assist with strengthening/mobility   - Keep Call bell within reach  - Keep bed low and locked with side rails adjusted as appropriate  - Keep care items and personal belongings within reach  - Initiate and maintain comfort rounds  - Make Fall Risk Sign visible to  staff  - Offer Toileting every  Hours, in advance of need  - Initiate/Maintain alarm  - Obtain necessary fall risk management equipment:   - Apply yellow socks and bracelet for high fall risk patients  - Consider moving patient to room near nurses station  Outcome: Adequate for Discharge  Goal: Maintain or return to baseline ADL function  Description: INTERVENTIONS:  -  Assess patient's ability to carry out ADLs; assess patient's baseline for ADL function and identify physical deficits which impact ability to perform ADLs (bathing, care of mouth/teeth, toileting, grooming, dressing, etc.)  - Assess/evaluate cause of self-care deficits   - Assess range of motion  - Assess patient's mobility; develop plan if impaired  - Assess patient's need for assistive devices and provide as appropriate  - Encourage maximum independence but intervene and supervise when necessary  - Involve family in performance of ADLs  - Assess for home care needs following discharge   - Consider OT consult to assist with ADL evaluation and planning for discharge  - Provide patient education as appropriate  Outcome: Adequate for Discharge  Goal: Maintains/Returns to pre admission functional level  Description: INTERVENTIONS:  - Perform AM-PAC 6 Click Basic Mobility/ Daily Activity assessment daily.  - Set and communicate daily mobility goal to care team and patient/family/caregiver.   - Collaborate with rehabilitation services on mobility goals if consulted  - Perform Range of Motion  times a day.  - Reposition patient every  hours.  - Dangle patient  times a day  - Stand patient  times a day  - Ambulate patient  times a day  - Out of bed to chair  times a day   - Out of bed for meals  times a day  - Out of bed for toileting  - Record patient progress and toleration of activity level   Outcome: Adequate for Discharge     Problem: Knowledge Deficit  Goal: Patient/family/caregiver demonstrates understanding of disease process, treatment plan,  medications, and discharge instructions  Description: Complete learning assessment and assess knowledge base.  Interventions:  - Provide teaching at level of understanding  - Provide teaching via preferred learning methods  Outcome: Adequate for Discharge     Problem: DISCHARGE PLANNING  Goal: Discharge to home or other facility with appropriate resources  Description: INTERVENTIONS:  - Identify barriers to discharge w/patient and caregiver  - Arrange for needed discharge resources and transportation as appropriate  - Identify discharge learning needs (meds, wound care, etc.)  - Arrange for interpretive services to assist at discharge as needed  - Refer to Case Management Department for coordinating discharge planning if the patient needs post-hospital services based on physician/advanced practitioner order or complex needs related to functional status, cognitive ability, or social support system  Outcome: Adequate for Discharge     Problem: POSTPARTUM  Goal: Experiences normal postpartum course  Description: INTERVENTIONS:  - Monitor maternal vital signs  - Assess uterine involution and lochia  Outcome: Adequate for Discharge  Goal: Appropriate maternal -  bonding  Description: INTERVENTIONS:  - Identify family support  - Assess for appropriate maternal/infant bonding   -Encourage maternal/infant bonding opportunities  - Referral to  or  as needed  Outcome: Adequate for Discharge  Goal: Establishment of infant feeding pattern  Description: INTERVENTIONS:  - Assess breast/bottle feeding  - Refer to lactation as needed  Outcome: Adequate for Discharge  Goal: Incision(s), wounds(s) or drain site(s) healing without S/S of infection  Description: INTERVENTIONS  - Assess and document dressing, incision, wound bed, drain sites and surrounding tissue  - Provide patient and family education  - Perform skin care/dressing changes every  Outcome: Adequate for Discharge

## 2024-06-26 NOTE — DISCHARGE SUMMARY
Discharge Summary - Jaimee Bowman 33 y.o. female MRN: [id number]  Unit/Bed#: -01 Encounter: [id number]    ADMISSION  Admission Date: 2024  Admitting Attending: Dr. Jacobo Shirley MD  Admitting Diagnoses:  Patient Active Problem List   Diagnosis    Maternal care due to low transverse uterine scar from previous  delivery    History of ectopic pregnancy    Postpartum state    BMI 34.0-34.9,adult    Thrombocytopenia affecting pregnancy (HCC)    Rh negative status during pregnancy    Paresthesia of skin    COVID-19 affecting pregnancy in first trimester    Iron deficiency anemia secondary to inadequate dietary iron intake    Uterine contractions    Gastroesophageal reflux disease       DELIVERY  Delivery Method: , Low Transverse  Delivery Date and Time: 2024 8:41 AM  Delivery Attending: Jacobo Shirley    DISCHARGE  Discharge Date: 24  Discharge Attending: Dr. Jacobo Shirley MD  Discharge Diagnosis:  Same, Delivered      Clinical course: Admission to Delivery  Jaimee Bowman is a 33 y.o.  who was admitted at 39w4d for repeat  section.    Delivery  Route of Delivery: , Low Transverse  Reason for  delivery: Prior  1  Anesthesia: Spinal    Delivery: , Low Transverse at 2024 8:41 AM    Baby's Weight: 3740 g (8 lb 3.9 oz); 131.92   Apgar scores: 8  and 9  at 1 and 5 minutes, respectively     Clinical Course: Post-Delivery:  The post delivery course was notable for some vaginal bleeding on POD#0, which responded to TXA and uterotonics.  Otherwise, the postoperative course has been uncomplicated.  On the day of discharge, the patient was ambulating, voiding spontaneously, tolerating oral intake, and hemodynamically stable. She was able to reasonably perform all ADLs. She had appropriate bowel function. Pain was well-controlled. She was discharged home on postpartum/postop day #2 without complications. Patient was instructed to  follow up with her OB as an outpatient and was given appropriate warnings to call her provider with problems or concerns.  Pertinent lab findings included:  Blood type AB negative; baby is also Rh negative    Last three Hgb values:  Lab Results   Component Value Date    HGB 8.9 (L) 2024    HGB 10.9 (L) 2024    HGB 11.6 2024       Problem-specific follow-up plans included the following:  Problem List       Maternal care due to low transverse uterine scar from previous  delivery    Overview     2016 pregnancy - Pushed for 2 hours.  Patient would like to TOLAC    24 Pt has reconsidered and would like a repeat LTCS  2024 Dr. Jenkins         Current Assessment & Plan     - Declines TOLAC. Will proceed with repeat  section, as scheduled.   - Prior to surgery, risks of surgery were reviewed. We specifically reviewed the risk of bleeding, infection, damage to surrounding organs/structures (specifically bowel, bladder, vessels, nerves, ureters), difficulty healing, scar tissue formation, hernia, need for hysterectomy, need for blood transfusion, and need for further surgery. We also reviewed implications for increased morbidity in subsequent pregnancy such as accreta or uterine rupture.              History of ectopic pregnancy    Overview     treated with Methotrexate         * (Principal) Postpartum state    BMI 34.0-34.9,adult    Thrombocytopenia affecting pregnancy (HCC)    Overview     Low platelets (148) noted on initial prenatal panel at 12 weeks. Patient was coincidentally +COVID at time of lab draw.  [x] Repeat CBC in 4 weeks.    1/15/2024: Plt 163  4/9 174         Rh negative status during pregnancy    Overview     Rhogam 2024         Current Assessment & Plan     - Plan for Rhogam postpartum.         Paresthesia of skin    COVID-19 affecting pregnancy in first trimester    Iron deficiency anemia secondary to inadequate dietary iron intake    Overview     Treat with  oral iron supplement         Uterine contractions    Gastroesophageal reflux disease       Discharge med list:     Medication List      START taking these medications     docusate sodium 100 mg capsule; Commonly known as: COLACE; Take 1   capsule (100 mg total) by mouth 2 (two) times a day as needed for   constipation   ibuprofen 600 mg tablet; Commonly known as: MOTRIN; Take 1 tablet (600   mg total) by mouth every 6 (six) hours   oxyCODONE 5 immediate release tablet; Commonly known as: ROXICODONE;   Take 1 tablet (5 mg total) by mouth every 4 (four) hours as needed for   severe pain for up to 10 days Max Daily Amount: 30 mg     CONTINUE taking these medications     ascorbic acid 500 mg tablet; Commonly known as: VITAMIN C   cholecalciferol 400 units tablet; Commonly known as: VITAMIN D3   cyanocobalamin 100 mcg tablet; Commonly known as: VITAMIN B-12   PRENATAL 1+1 PO   Slow Fe 142 (45 Fe) MG Tbcr; Generic drug: Ferrous Sulfate ER       Condition at discharge:  good    Disposition:  Home    Planned Readmission:  No

## 2024-06-26 NOTE — PROGRESS NOTES
"Ob Postpartum/Postop Note  Jaimee Bowman 33 y.o. female MRN: [id number]  Unit/Bed#: -01 Encounter: [id number]    A/P.  33 y.o.  POD#2 s/p , Low Transverse at 39w4d of 3740 g (8 lb 3.9 oz) male , apgars 8 /9 .  (1) POD#2.  Delivered 2024  8:41 AM.  Would very much like to go home.  Has Mepilex dressing.  --> Discharge home.  --> Follow up one week for incision check.    (2) Rh negative.  Infant Rh negative as well.  --> RhoGAM not indicated.    (3) Postop anemia.  Got IV iron x 1.  No longer has IV.  --> Home on oral iron.    Wilfrido Yun MD  24  ----------------------------------------------------------------------------------------------    Subjective/    Feels well.  Tolerating po, ambulating, voiding without difficulty.  Happy.  Breastfeeding/bonding.  Pain controlled with oral medications.  (+)flatus.    Objective/  Blood pressure 126/87, pulse 84, temperature 97.6 °F (36.4 °C), temperature source Oral, resp. rate 18, height 5' 6\" (1.676 m), weight 104 kg (229 lb), last menstrual period 2023, SpO2 98%, currently breastfeeding.    Patient Vitals for the past 24 hrs:   BP Temp Temp src Pulse Resp SpO2   24 0800 126/87 97.6 °F (36.4 °C) Oral 84 18 98 %   24 0253 136/87 97.6 °F (36.4 °C) Temporal 85 18 98 %   24 2306 125/74 98 °F (36.7 °C) Oral 86 18 99 %   24 1946 122/89 98.5 °F (36.9 °C) Oral 88 18 98 %   24 1541 126/85 (!) 97.4 °F (36.3 °C) Temporal 87 18 100 %         Physical Exam/      General:  Alert, comfortable, NAD      Cardiovascular: Regular rate and rhythm      Respiratory: Clear to auscultation bilaterally.      Abdomen: Soft, non-tender, non-distended.  Dressing clean and dry.      Fundus: Firm, below umbilicus, nontender      Extremities: Warm, non-tender      Labs/      Blood type:  AB-/-      Rubella: Immune      Lab Results   Component Value Date    WBC 11.88 (H) 2024    HGB 8.9 (L) 2024    HCT 26.6 " (L) 06/25/2024    MCV 87 06/25/2024     (L) 06/25/2024

## 2024-06-26 NOTE — NURSING NOTE
AVS and discharge teaching completed with patient and significant other at bedside. All questions answered.

## 2024-06-26 NOTE — UTILIZATION REVIEW
NOTIFICATION OF ADMISSION DISCHARGE   This is a Notification of Discharge from [facility]. Please be advised that this patient has been discharge from our facility. Below you will find the admission and discharge date and time including the patient’s disposition.   UTILIZATION REVIEW CONTACT:  Kirsty Basurto  Utilization   Network Utilization Review Department  Phone: [phone] x carefully listen to the prompts. All voicemails are confidential.  Email: [email or web address]     ADMISSION INFORMATION  PRESENTATION DATE: 6/24/2024  5:56 AM  OBERVATION ADMISSION DATE:   INPATIENT ADMISSION DATE: 6/24/24  6:09 AM   DISCHARGE DATE: 6/26/2024 12:19 PM   DISPOSITION:Home/Self Care    Network Utilization Review Department  ATTENTION: Please call with any questions or concerns to [phone] and carefully listen to the prompts so that you are directed to the right person. All voicemails are confidential.   For Discharge needs, contact Care Management DC Support Team at [phone] opt. [phone]  Send all requests for admission clinical reviews, approved or denied determinations and any other requests to dedicated fax number below belonging to the campus where the patient is receiving treatment. List of dedicated fax numbers for the Facilities:  FACILITY NAME UR FAX NUMBER   ADMISSION DENIALS (Administrative/Medical Necessity) [phone]   DISCHARGE SUPPORT TEAM ([facility]) [phone]   PARENT CHILD HEALTH (Maternity/NICU/Pediatrics) [phone]   [facility] [phone]   [facility] [phone]   [facility] [phone]   [facility] [phone]   [facility] [phone]   [facility] [phone]   [facility] [phone]   [facility] [phone]   [facility]  [facility] [phone]   [facility] [phone]   [facility] [phone]   [facility] [phone]

## 2024-07-01 ENCOUNTER — POSTPARTUM VISIT (OUTPATIENT)
Dept: OBGYN CLINIC | Facility: CLINIC | Age: 34
End: 2024-07-01

## 2024-07-01 VITALS
DIASTOLIC BLOOD PRESSURE: 70 MMHG | SYSTOLIC BLOOD PRESSURE: 122 MMHG | HEIGHT: 66 IN | BODY MASS INDEX: 34.62 KG/M2 | WEIGHT: 215.4 LBS

## 2024-07-01 DIAGNOSIS — Z48.89 ENCOUNTER FOR POSTOPERATIVE WOUND CHECK: Primary | ICD-10-CM

## 2024-07-01 PROCEDURE — 99024 POSTOP FOLLOW-UP VISIT: CPT | Performed by: STUDENT IN AN ORGANIZED HEALTH CARE EDUCATION/TRAINING PROGRAM

## 2024-07-01 NOTE — PROGRESS NOTES
[facility] - [place]  [address] [place] [address]    Assessment/Plan:  1. Encounter for postoperative wound check  Assessment & Plan:  - Mepilex dressing removed. Wound healing well. No evidence of post-surgical complication.  - Return to office for routine postpartum visit in 2 weeks.       Subjective:   Jaimee Bowman is a 33 y.o.  now POD#7 s/p repeat  section who presents for routine wound check visit.   CC:   Chief Complaint   Patient presents with    Postpartum Care     Incision check        Date of surgery: 2024  Procedure: Repeat low-transverse  section  Pathology:   Final Diagnosis   A. Placenta, 39 weeks,  section:   -   Black placenta, weighing 527.4 g, 50th-75th percentile for 39 weeks gestational age.  -   Umbilical cord with phlebitis (fetal stage 1, grade 1), trivascular, eccentrically inserted.  -   Fetal membranes with meconium deposition and acute chorioamnionitis (maternal stage 2, grade 1), marginally inserted.   -   Chorionic villi with adequate maturation; negative for inflammation and infarct.   -   Decidua, negative for vasculopathy.       HPI: Patient presents for routine wound check. She is without complaint. She reports minimal post-surgical pain. Light lochia. Denies fever, chills, redness, or drainage. Mepilex dressing has remained intact.     ROS: Negative except as noted in HPI    The following portions of the patient's history were reviewed and updated as appropriate:   Past Medical History:   Diagnosis Date    Ectopic pregnancy         History of ectopic pregnancy 10/2022    treated with Methotrexate    Migraine     Obesity     Sleep apnea in adult     Varicella     as child     Past Surgical History:   Procedure Laterality Date     SECTION  10/8/2016    FL  DELIVERY ONLY N/A 2024    Procedure:  SECTION () REPEAT;  Surgeon: Jacobo Shirley MD;  Location: [facility];  Service:  Obstetrics    [place] TX MISSED  FIRST TRIMESTER SURGICAL N/A 2023    Procedure: DILATATION AND EVACUATION (D&E) (8WEEKS);  Surgeon: Mary Cordero MD;  Location:  MAIN OR;  Service: Gynecology    WISDOM TOOTH EXTRACTION       Family History   Problem Relation Age of Onset    No Known Problems Mother     Diabetes Father     Cancer Maternal Grandmother         Bladder cancer    Skin cancer Maternal Grandfather     Cancer Maternal Grandfather         Skin cancer    Diabetes Paternal Grandmother     No Known Problems Paternal Grandfather     No Known Problems Half-Sister     No Known Problems Maternal Aunt     No Known Problems Maternal Aunt      Social History     Socioeconomic History    Marital status: /Civil Union     Spouse name: Not on file    Number of children: Not on file    Years of education: Not on file    Highest education level: Not on file   Occupational History    Not on file   Tobacco Use    Smoking status: Former     Current packs/day: 0.00     Average packs/day: 1 pack/day for 5.7 years (5.7 ttl pk-yrs)     Types: Cigarettes     Start date: 2007     Quit date: 2013     Years since quittin.2     Passive exposure: Never    Smokeless tobacco: Never    Tobacco comments:     Quit 10 years ago   Vaping Use    Vaping status: Never Used   Substance and Sexual Activity    Alcohol use: Not Currently     Alcohol/week: 1.0 standard drink of alcohol     Types: 1 Cans of beer per week    Drug use: Never    Sexual activity: Yes     Partners: Male     Birth control/protection: None   Other Topics Concern    Not on file   Social History Narrative    Not on file     Social Determinants of Health     Financial Resource Strain: Not on file   Food Insecurity: No Food Insecurity (2024)    Hunger Vital Sign     Worried About Running Out of Food in the Last Year: Never true     Ran Out of Food in the Last Year: Never true   Transportation Needs: No Transportation Needs (2024)     "PRAPARE - Transportation     Lack of Transportation (Medical): No     Lack of Transportation (Non-Medical): No   Physical Activity: Not on file   Stress: Not on file   Social Connections: Not on file   Intimate Partner Violence: Not on file   Housing Stability: Low Risk  (5/28/2024)    Housing Stability Vital Sign     Unable to Pay for Housing in the Last Year: No     Number of Times Moved in the Last Year: 0     Homeless in the Last Year: No     Outpatient Medications Marked as Taking for the 7/1/24 encounter (Postpartum Visit) with Jacobo Shirley MD   Medication    ascorbic acid (VITAMIN C) 500 mg tablet    cholecalciferol (VITAMIN D3) 400 units tablet    cyanocobalamin (VITAMIN B-12) 100 mcg tablet    docusate sodium (COLACE) 100 mg capsule    Ferrous Sulfate ER (Slow Fe) 142 (45 Fe) MG TBCR    ibuprofen (MOTRIN) 600 mg tablet    oxyCODONE (ROXICODONE) 5 immediate release tablet    Prenatal Vit-Fe Fumarate-FA (PRENATAL 1+1 PO)     No Known Allergies        Objective:  /70 (BP Location: Left arm, Patient Position: Sitting, Cuff Size: Large)   Ht 5' 6\" (1.676 m)   Wt 97.7 kg (215 lb 6.4 oz)   LMP 09/21/2023 (Exact Date)   Breastfeeding Yes   BMI 34.77 kg/m²      General Appearance: alert and oriented, in no acute distress.   Abdomen: Soft, non-tender, non-distended, no masses, no rebound or guarding. Mepilex dressing and steri strips removed. Wound intact and without erythema, induration, fluctuance, or drainage.   Extremities: Normal range of motion.   Skin: normal, no rash or abnormalities  Neurologic: alert, oriented x3  Psychiatric: Appropriate affect, mood stable, cooperative with exam.        Jacobo Shirley MD  7/1/2024 3:03 PM  "

## 2024-07-01 NOTE — ASSESSMENT & PLAN NOTE
- Mepilex dressing removed. Wound healing well. No evidence of post-surgical complication.  - Return to office for routine postpartum visit in 2 weeks.

## 2024-07-15 ENCOUNTER — POSTPARTUM VISIT (OUTPATIENT)
Dept: OBGYN CLINIC | Facility: CLINIC | Age: 34
End: 2024-07-15

## 2024-07-15 VITALS
BODY MASS INDEX: 32.88 KG/M2 | DIASTOLIC BLOOD PRESSURE: 84 MMHG | WEIGHT: 204.6 LBS | SYSTOLIC BLOOD PRESSURE: 122 MMHG | HEIGHT: 66 IN

## 2024-07-15 PROCEDURE — 99024 POSTOP FOLLOW-UP VISIT: CPT | Performed by: STUDENT IN AN ORGANIZED HEALTH CARE EDUCATION/TRAINING PROGRAM

## 2024-07-15 NOTE — PROGRESS NOTES
"[facility] - [place]  [address] [place] [address]    Assessment/Plan:  Jaimee is a 33 y.o. year old  who presents for postpartum visit.    Routine Postpartum Care  Normal postpartum exam  Contraception:  Options reviewed. Patient desires Mirena IUD insertion. Will schedule.  Depression Screen: Low risk  Feeding: Breastfeeding  Cervical cancer screening Up to Date. Next due   Follow up for IUD insertion, then for annual exam or as needed.    Additional Problems:  1. Postpartum examination following  delivery        Subjective:     CC: Postpartum visit    Jaimee Bowman is a 33 y.o. y.o. female  who presents for a postpartum visit.     She is 3 weeks postpartum following a repeat  section on 2024 at 39 weeks. Postpartum course has been uncomplicated.     Bleeding thin lochia. Bowel function is normal. Bladder function is normal. Patient is not sexually active.     Postpartum Depression: Low Risk  (7/15/2024)    [place]  Depression Scale     Last EPDS Total Score: 4     Last EPDS Self Harm Result: Never       The following portions of the patient's history were reviewed and updated as appropriate: allergies, current medications, past family history, past medical history, obstetric history, gynecologic history, past social history, past surgical history and problem list.    Objective:  /84 (BP Location: Left arm, Patient Position: Sitting, Cuff Size: Standard)   Ht 5' 6\" (1.676 m)   Wt 92.8 kg (204 lb 9.6 oz)   LMP 2023 (Exact Date)   Breastfeeding Yes   BMI 33.02 kg/m²   Pregravid Weight/BMI: 95.3 kg (210 lb) (BMI 33.91)  Current Weight: 92.8 kg (204 lb 9.6 oz)   Total Weight Gain: 8.618 kg (19 lb)   Pre- Vitals      Flowsheet Row Most Recent Value   Prenatal Assessment    Prenatal Vitals    Blood Pressure 122/84   Weight - Scale 92.8 kg (204 lb 9.6 oz)   Urine Albumin/Glucose    Dilation/Effacement/Station    Vaginal " Drainage    Edema              Chaperone present? Yes: Dave Judd MA.    General Appearance: alert and oriented, in no acute distress.   Abdomen: Soft, non-tender, non-distended, no masses, no rebound or guarding. Pfannenstiel incision well-healing.   Pelvic:       External genitalia: Normal appearance, no abnormal pigmentation, no lesions or masses. Normal [place]'s.       Urinary system: Urethral meatus normal, bladder non-tender.      Vaginal: normal mucosa without prolapse or lesions. Normal-appearing physiologic discharge.      Cervix: Normal-appearing, well-epithelialized, no gross lesions or masses. No cervical motion tenderness.      Adnexa: No adnexal masses or tenderness noted.      Uterus: Normal-sized, regular contour, midline, mobile, no uterine tenderness.   Extremities: Normal range of motion.   Skin: normal, no rash or abnormalities  Neurologic: alert, oriented x3  Psychiatric: Appropriate affect, mood stable, cooperative with exam.        Jacobo Shirley MD  7/15/2024 1:29 PM

## 2024-08-13 ENCOUNTER — PROCEDURE VISIT (OUTPATIENT)
Dept: OBGYN CLINIC | Facility: CLINIC | Age: 34
End: 2024-08-13
Payer: COMMERCIAL

## 2024-08-13 VITALS
WEIGHT: 202.4 LBS | HEIGHT: 66 IN | BODY MASS INDEX: 32.53 KG/M2 | SYSTOLIC BLOOD PRESSURE: 112 MMHG | DIASTOLIC BLOOD PRESSURE: 80 MMHG

## 2024-08-13 DIAGNOSIS — Z30.430 ENCOUNTER FOR INSERTION OF MIRENA IUD: Primary | ICD-10-CM

## 2024-08-13 DIAGNOSIS — Z32.02 PREGNANCY TEST NEGATIVE: ICD-10-CM

## 2024-08-13 DIAGNOSIS — Z11.3 SCREENING EXAMINATION FOR STI: ICD-10-CM

## 2024-08-13 PROBLEM — Z97.5 IUD (INTRAUTERINE DEVICE) IN PLACE: Status: ACTIVE | Noted: 2024-08-13

## 2024-08-13 LAB — SL AMB POCT URINE HCG: NEGATIVE

## 2024-08-13 PROCEDURE — 87491 CHLMYD TRACH DNA AMP PROBE: CPT | Performed by: STUDENT IN AN ORGANIZED HEALTH CARE EDUCATION/TRAINING PROGRAM

## 2024-08-13 PROCEDURE — 81025 URINE PREGNANCY TEST: CPT | Performed by: STUDENT IN AN ORGANIZED HEALTH CARE EDUCATION/TRAINING PROGRAM

## 2024-08-13 PROCEDURE — 87591 N.GONORRHOEAE DNA AMP PROB: CPT | Performed by: STUDENT IN AN ORGANIZED HEALTH CARE EDUCATION/TRAINING PROGRAM

## 2024-08-13 PROCEDURE — 58300 INSERT INTRAUTERINE DEVICE: CPT | Performed by: STUDENT IN AN ORGANIZED HEALTH CARE EDUCATION/TRAINING PROGRAM

## 2024-08-13 NOTE — PROGRESS NOTES
IUD Procedure    Date/Time: 8/13/2024 11:45 AM    Performed by: Jacobo Shirley MD  Authorized by: Jacobo Shirley MD    Written consent obtained?: Yes    Risks and benefits: Risks, benefits and alternatives were discussed    Consent given by:  Patient  Patient states understanding of procedure being performed: Yes    Patient's understanding of procedure matches consent: Yes    Procedure consent matches procedure scheduled: Yes    Required items: Required blood products, implants, devices and special equipment available    Patient identity confirmed:  Verbally with patient  Select procedure: IUD insertion    IUD Insertion:     Pelvic exam performed: yes      Negative GC/chlamydia test: Swab collected prior to device insertion.      Negative urine pregnancy test: yes      Cervix cleaned and prepped: yes      Speculum placed in vagina: yes      Tenaculum applied to cervix: yes      IUD inserted with no complications: yes      Strings trimmed: yes      Uterus sounded: yes      Uterus sound depth (cm):  9    IUD type:  1 each Levonorgestrel 20 MCG/DAY  Post-procedure:     Patient tolerated procedure well: yes      Jacobo Shirley MD  8/13/2024 11:49 AM

## 2024-08-14 LAB
C TRACH DNA SPEC QL NAA+PROBE: NEGATIVE
N GONORRHOEA DNA SPEC QL NAA+PROBE: NEGATIVE

## 2024-10-15 ENCOUNTER — ANNUAL EXAM (OUTPATIENT)
Dept: OBGYN CLINIC | Facility: CLINIC | Age: 34
End: 2024-10-15
Payer: COMMERCIAL

## 2024-10-15 VITALS
WEIGHT: 205.2 LBS | BODY MASS INDEX: 32.98 KG/M2 | DIASTOLIC BLOOD PRESSURE: 72 MMHG | SYSTOLIC BLOOD PRESSURE: 124 MMHG | HEIGHT: 66 IN

## 2024-10-15 DIAGNOSIS — Z01.419 ENCOUNTER FOR ANNUAL ROUTINE GYNECOLOGICAL EXAMINATION: Primary | ICD-10-CM

## 2024-10-15 DIAGNOSIS — Z30.431 ENCOUNTER FOR ROUTINE CHECKING OF INTRAUTERINE CONTRACEPTIVE DEVICE (IUD): ICD-10-CM

## 2024-10-15 PROCEDURE — S0612 ANNUAL GYNECOLOGICAL EXAMINA: HCPCS | Performed by: STUDENT IN AN ORGANIZED HEALTH CARE EDUCATION/TRAINING PROGRAM

## 2024-10-15 NOTE — ASSESSMENT & PLAN NOTE
- Exam consistent with appropriate position of IUD. Strings slightly long, but neither patient nor partner have felt them. Therefore not trimmed today. Instructed patient to call if she or partner feel strings and these can be trimmed in office.   - Return to office for routine care or PRN.

## 2024-10-15 NOTE — PROGRESS NOTES
[facility] - [place]  [address] [place] [address]    ASSESSMENT/PLAN: Jaimee Bowman is a 34 y.o.  who presents for annual gynecologic exam.    Encounter for routine gynecologic examination  - Routine well gynecologic exam completed today.  - Cervical Cancer Screening: Current ASCCP Guidelines reviewed. Last Pap: 2023. History of abnormal: None  - HPV Vaccination status: Immunization series complete  - STI screening offered including HIV testing: offered, pt declined  - Contraceptive counseling discussed.  Current contraception: IUD, satisfied.    - The following were reviewed in today's visit: breast self exam, exercise, and healthy diet.    Additional problems addressed during this visit:  1. Encounter for annual routine gynecological examination  2. Encounter for routine checking of intrauterine contraceptive device (IUD)  Assessment & Plan:  - Exam consistent with appropriate position of IUD. Strings slightly long, but neither patient nor partner have felt them. Therefore not trimmed today. Instructed patient to call if she or partner feel strings and these can be trimmed in office.   - Return to office for routine care or PRN.       CC:  Annual Gynecologic Examination    HPI: Jamiee Bowman is a 34 y.o.  who presents for annual gynecologic examination. She reports some light bleeding after Mirena IUD insertion, but this has now resolved. She is satisfied with IUD for contraception. She is without complaint today.     ROS: Negative except as noted in HPI    Patient's last menstrual period was 2024 (exact date).       She  reports being sexually active and has had partner(s) who are male. She reports using the following method of birth control/protection: I.U.D..       The following portions of the patient's history were reviewed and updated as appropriate:   Past Medical History:   Diagnosis Date    Ectopic pregnancy         Migraine     Obesity      Sleep apnea in adult     Varicella     as child     Past Surgical History:   Procedure Laterality Date     SECTION  10/8/2016    [place]  DELIVERY ONLY N/A 2024    Procedure:  SECTION () REPEAT;  Surgeon: Jacobo Shirley MD;  Location: [facility];  Service: Obstetrics    [place] TX MISSED  FIRST TRIMESTER SURGICAL N/A 2023    Procedure: DILATATION AND EVACUATION (D&E) (8WEEKS);  Surgeon: Mary Cordero MD;  Location:  MAIN OR;  Service: Gynecology    WISDOM TOOTH EXTRACTION       Family History   Problem Relation Age of Onset    No Known Problems Mother     Diabetes Father     Cancer Maternal Grandmother         Bladder cancer    Skin cancer Maternal Grandfather     Cancer Maternal Grandfather         Skin cancer    Diabetes Paternal Grandmother     No Known Problems Paternal Grandfather     No Known Problems Maternal Aunt     No Known Problems Maternal Aunt     No Known Problems Half-Sister     Breast cancer Neg Hx     Ovarian cancer Neg Hx     Uterine cancer Neg Hx     Colon cancer Neg Hx      Social History     Socioeconomic History    Marital status: /Civil Union     Spouse name: None    Number of children: None    Years of education: None    Highest education level: None   Occupational History    None   Tobacco Use    Smoking status: Former     Current packs/day: 0.00     Average packs/day: 1 pack/day for 5.7 years (5.7 ttl pk-yrs)     Types: Cigarettes     Start date: 2007     Quit date: 2013     Years since quittin.5     Passive exposure: Never    Smokeless tobacco: Never    Tobacco comments:     Quit 10 years ago   Vaping Use    Vaping status: Never Used   Substance and Sexual Activity    Alcohol use: Not Currently     Alcohol/week: 1.0 standard drink of alcohol     Types: 1 Cans of beer per week    Drug use: Never    Sexual activity: Yes     Partners: Male     Birth control/protection: I.U.D.   Other Topics Concern    None   Social History Narrative  "   None     Social Determinants of Health     Financial Resource Strain: Not on file   Food Insecurity: No Food Insecurity (5/28/2024)    Hunger Vital Sign     Worried About Running Out of Food in the Last Year: Never true     Ran Out of Food in the Last Year: Never true   Transportation Needs: No Transportation Needs (5/28/2024)    PRAPARE - Transportation     Lack of Transportation (Medical): No     Lack of Transportation (Non-Medical): No   Physical Activity: Not on file   Stress: Not on file   Social Connections: Not on file   Intimate Partner Violence: Not on file   Housing Stability: Low Risk  (5/28/2024)    Housing Stability Vital Sign     Unable to Pay for Housing in the Last Year: No     Number of Times Moved in the Last Year: 0     Homeless in the Last Year: No     Outpatient Medications Marked as Taking for the 10/15/24 encounter (Annual Exam) with Jacobo Shirley MD   Medication    cholecalciferol (VITAMIN D3) 400 units tablet    ibuprofen (MOTRIN) 600 mg tablet    Levonorgestrel (MIRENA) 20 MCG/DAY IUD    Prenatal Vit-Fe Fumarate-FA (PRENATAL 1+1 PO)     No Known Allergies        Objective:  /72 (BP Location: Left arm, Patient Position: Sitting, Cuff Size: Standard)   Ht 5' 6\" (1.676 m)   Wt 93.1 kg (205 lb 3.2 oz)   LMP 08/18/2024 (Exact Date) Comment: IUD  Breastfeeding Yes   BMI 33.12 kg/m²        Chaperone present? Yes: Margo Ansari MA.    General Appearance: alert and oriented, in no acute distress.   Neck/Thyroid: No thyromegaly, no thyroid nodules.  Respiratory: Unlabored breathing.  Cardiovascular: Regular rate, no peripheral edema.  Abdomen: Soft, non-tender, non-distended, no masses, no rebound or guarding.  Breast Exam: No dimpling, nipple retraction or discharge. No lumps or masses. No axillary or supraclavicular nodes.   Pelvic:       External genitalia: Normal appearance, no abnormal pigmentation, no lesions or masses. Normal Bartholin's and Keo's.      Urinary system: " Urethral meatus normal, bladder non-tender.      Vaginal: normal mucosa without prolapse or lesions. Normal-appearing physiologic discharge.      Cervix: Normal-appearing, well-epithelialized, no gross lesions or masses. No cervical motion tenderness. IUD strings present at os, slightly long.       Adnexa: No adnexal masses or tenderness noted.      Uterus: Normal-sized, regular contour, midline, mobile, no uterine tenderness.  Extremities: Normal range of motion. Warm, well-perfused, non-tender.   Skin: normal, no rash or abnormalities  Neurologic: alert, oriented x3  Psychiatric: Appropriate affect, mood stable, cooperative with exam.        Jacobo Shirley MD  10/15/2024 1:22 PM

## 2025-02-23 ENCOUNTER — OFFICE VISIT (OUTPATIENT)
Dept: URGENT CARE | Facility: CLINIC | Age: 35
End: 2025-02-23
Payer: COMMERCIAL

## 2025-02-23 VITALS
HEART RATE: 97 BPM | TEMPERATURE: 97.5 F | WEIGHT: 200.8 LBS | HEIGHT: 66 IN | DIASTOLIC BLOOD PRESSURE: 77 MMHG | OXYGEN SATURATION: 98 % | SYSTOLIC BLOOD PRESSURE: 118 MMHG | BODY MASS INDEX: 32.27 KG/M2 | RESPIRATION RATE: 18 BRPM

## 2025-02-23 DIAGNOSIS — J06.9 UPPER RESPIRATORY TRACT INFECTION, UNSPECIFIED TYPE: Primary | ICD-10-CM

## 2025-02-23 DIAGNOSIS — J02.9 SORE THROAT: ICD-10-CM

## 2025-02-23 LAB — S PYO AG THROAT QL: NEGATIVE

## 2025-02-23 PROCEDURE — S9083 URGENT CARE CENTER GLOBAL: HCPCS | Performed by: NURSE PRACTITIONER

## 2025-02-23 PROCEDURE — 87880 STREP A ASSAY W/OPTIC: CPT | Performed by: NURSE PRACTITIONER

## 2025-02-23 PROCEDURE — G0382 LEV 3 HOSP TYPE B ED VISIT: HCPCS | Performed by: NURSE PRACTITIONER

## 2025-02-23 PROCEDURE — 87070 CULTURE OTHR SPECIMN AEROBIC: CPT | Performed by: NURSE PRACTITIONER

## 2025-02-23 NOTE — PROGRESS NOTES
[facility]        NAME: Jaimee Bowman is a 34 y.o. female  : 1990    MRN: [id number]  DATE: 2025  TIME: 9:49 AM    Assessment and Plan   Upper respiratory tract infection, unspecified type [J06.9]  1. Upper respiratory tract infection, unspecified type        2. Sore throat  POCT rapid strepA    Throat culture            Patient Instructions     Rapid strep is negative  Will send for culture  OTC med for cold symptoms   Follow up with PCP in 3-5 days.  Proceed to  ER if symptoms worsen.    If tests have been performed at [facility], our office will contact you with results if changes need to be made to the care plan discussed with you at the visit.  You can review your full results on [facility].    Chief Complaint     Chief Complaint   Patient presents with    Sore Throat     Started on Friday with headache, ear pain in both ears, denies drainage, denies diminished hearing, denies fever, concerned for strep, and she's been managing with Tylenol          History of Present Illness       Sore Throat   This is a new problem. The current episode started yesterday. The problem has been gradually worsening. Neither side of throat is experiencing more pain than the other. There has been no fever. The fever has been present for Less than 1 day. The pain is at a severity of 5/10. The pain is moderate. Associated symptoms include congestion, ear pain, neck pain and swollen glands. Pertinent negatives include no coughing, diarrhea, headaches or vomiting.       Review of Systems   Review of Systems   Constitutional:  Negative for fever.   HENT:  Positive for congestion, ear pain and sore throat. Negative for rhinorrhea.    Respiratory:  Negative for cough and wheezing.    Cardiovascular:  Negative for chest pain.   Gastrointestinal:  Negative for diarrhea and vomiting.   Musculoskeletal:  Positive for neck pain.   Neurological:  Negative for headaches.         Current Medications        Current Outpatient Medications:     cholecalciferol (VITAMIN D3) 400 units tablet, Take by mouth daily, Disp: , Rfl:     ibuprofen (MOTRIN) 600 mg tablet, Take 1 tablet (600 mg total) by mouth every 6 (six) hours, Disp: 30 tablet, Rfl: 0    Levonorgestrel (MIRENA) 20 MCG/DAY IUD, 1 each by Intrauterine Device route Once every 8 years, Disp: , Rfl:     Prenatal Vit-Fe Fumarate-FA (PRENATAL 1+1 PO), Take by mouth, Disp: , Rfl:     Current Allergies     Allergies as of 2025    (No Known Allergies)            The following portions of the patient's history were reviewed and updated as appropriate: allergies, current medications, past family history, past medical history, past social history, past surgical history and problem list.     Past Medical History:   Diagnosis Date    Ectopic pregnancy         Migraine     Obesity     Sleep apnea in adult     Varicella     as child       Past Surgical History:   Procedure Laterality Date     SECTION  10/8/2016    [place]  DELIVERY ONLY N/A 2024    Procedure:  SECTION () REPEAT;  Surgeon: Jacobo Shirley MD;  Location: [facility];  Service: Obstetrics    [place] TX MISSED  FIRST TRIMESTER SURGICAL N/A 2023    Procedure: DILATATION AND EVACUATION (D&E) (8WEEKS);  Surgeon: Mary Cordero MD;  Location:  MAIN OR;  Service: Gynecology    WISDOM TOOTH EXTRACTION         Family History   Problem Relation Age of Onset    No Known Problems Mother     Diabetes Father     Cancer Maternal Grandmother         Bladder cancer    Skin cancer Maternal Grandfather     Cancer Maternal Grandfather         Skin cancer    Diabetes Paternal Grandmother     No Known Problems Paternal Grandfather     No Known Problems Maternal Aunt     No Known Problems Maternal Aunt     No Known Problems Half-Sister     Breast cancer Neg Hx     Ovarian cancer Neg Hx     Uterine cancer Neg Hx     Colon cancer Neg Hx          Medications have been  "verified.        Objective   /77 (BP Location: Left arm, Patient Position: Sitting, Cuff Size: Large)   Pulse 97   Temp 97.5 °F (36.4 °C) (Tympanic)   Resp 18   Ht 5' 6\" (1.676 m)   Wt 91.1 kg (200 lb 12.8 oz)   SpO2 98%   Breastfeeding Yes   BMI 32.41 kg/m²   No LMP recorded. (Menstrual status: Birth Control).       Physical Exam     Physical Exam  Constitutional:       Appearance: She is not ill-appearing.   HENT:      Right Ear: Tympanic membrane normal.      Left Ear: Tympanic membrane normal.      Nose: Rhinorrhea present.      Mouth/Throat:      Pharynx: Posterior oropharyngeal erythema (mild) present.      Tonsils: No tonsillar exudate. 0 on the right. 0 on the left.   Cardiovascular:      Rate and Rhythm: Regular rhythm.   Pulmonary:      Breath sounds: Normal breath sounds.                   "

## 2025-02-25 LAB — BACTERIA THROAT CULT: NORMAL

## 2025-03-15 ENCOUNTER — NURSE TRIAGE (OUTPATIENT)
Dept: OTHER | Facility: OTHER | Age: 35
End: 2025-03-15

## 2025-03-15 DIAGNOSIS — N61.0 MASTITIS: Primary | ICD-10-CM

## 2025-03-15 RX ORDER — DICLOXACILLIN SODIUM 500 MG/1
500 CAPSULE ORAL 4 TIMES DAILY
Qty: 28 CAPSULE | Refills: 0 | Status: SHIPPED | OUTPATIENT
Start: 2025-03-15 | End: 2025-03-22

## 2025-03-15 NOTE — TELEPHONE ENCOUNTER
"Answer Assessment - Initial Assessment Questions  1. NAME of MEDICINE: \"What medicine(s) are you calling about?\"      Antibiotic not yet at pharmacy    Protocols used: Medication Question Call-Adult-AH    Per chart review, Abx not yet entered.  Messaged On call to direct to   [id number] - [facility] - [address]  Phone: [phone]            On call provider contacted and entered script.  Patient made aware.  "

## 2025-03-15 NOTE — TELEPHONE ENCOUNTER
Reason for Disposition  • Caller has medicine question only, adult not sick, AND triager answers question    Protocols used: Medication Question Call-Adult-

## 2025-03-15 NOTE — TELEPHONE ENCOUNTER
"Regarding: mastitis/antibiotics  ----- Message from Valery LOPEZ sent at 3/15/2025  4:52 PM EDT -----  \"I got mastitis and wanted to know if I could be prescribed an antibiotic\"    "

## 2025-03-15 NOTE — TELEPHONE ENCOUNTER
"Regarding: Mastitis / Prescription  ----- Message from Toni LUCERO sent at 3/15/2025  5:43 PM EDT -----  \"I have mastitis and I was supposed to get a prescription send to the pharmacy but my  is at the pharmacy and they have not received it.\"    "

## 2025-03-15 NOTE — TELEPHONE ENCOUNTER
"FOLLOW UP: N/A    REASON FOR CONVERSATION: Breast Pain    SYMPTOMS: right breast pain, swelling, not relieved with feeding; temp 100    OTHER: On call provider ordered Abx; home care reviewed.  Pt verbalized understanding.    DISPOSITION: Home Care,  Now      Reason for Disposition   [1] Breast lump AND [2] present < 7 days    Answer Assessment - Initial Assessment Questions  1. SYMPTOM: \"What's the main symptom you're concerned about?\" (e.g., pain, redness, swelling)        Right breast tender and swollen    2. ONSET: \"When did the  tender/swollen  start?\"        First noticed 2 pm    3. LOCATION: \"Which breast?\" (e.g., left, right, both) \"Is the pain in the breast or just the nipple?\"        Right     4. PAIN: \"How bad is the pain?\"  (Scale 1-10; or mild, moderate, severe)        7/10    5. REDNESS: \"Does the skin appear red? Does the breast feel hot to touch?\"         Slightly red  Warm to the touch    6. LUMP OR SWELLING: \"Does the breast feel hard or have lumps?\"        Able to palpate a hard lump    7. DELIVERY DATE: \"When was your delivery date?\" \"Vaginal delivery or ?\"            8. BREASTFEEDING AND PUMPING: \"Did you breastfeed your baby or use a breast pump after delivery?\" If Yes, ask: \"When did you last breastfeed or pump your breasts?\"        Breastfeeds and pumps    9. FEVER: \"Do you have a fever?\" If Yes, ask: \"What is it, how was it measured, and when did it start?\"        Fever/chills  100    10. OTHER SYMPTOMS: \"Do you have any other symptoms?\" (e.g., feeling sad or depressed, nipple symptoms)        Denies    Protocols used: Postpartum - Breast Pain and Engorgement-Adult-AH    "

## 2025-06-17 ENCOUNTER — APPOINTMENT (OUTPATIENT)
Dept: LAB | Facility: HOSPITAL | Age: 35
End: 2025-06-17
Payer: COMMERCIAL

## 2025-06-17 DIAGNOSIS — Z00.00 WELL ADULT EXAM: ICD-10-CM

## 2025-06-17 DIAGNOSIS — R63.8 INCREASED BMI: ICD-10-CM

## 2025-06-17 LAB
ALBUMIN SERPL BCG-MCNC: 4.7 G/DL (ref 3.5–5)
ALP SERPL-CCNC: 86 U/L (ref 34–104)
ALT SERPL W P-5'-P-CCNC: 12 U/L (ref 7–52)
ANION GAP SERPL CALCULATED.3IONS-SCNC: 6 MMOL/L (ref 4–13)
AST SERPL W P-5'-P-CCNC: 8 U/L (ref 13–39)
BASOPHILS # BLD AUTO: 0.05 THOUSANDS/ÂΜL (ref 0–0.1)
BASOPHILS NFR BLD AUTO: 1 % (ref 0–1)
BILIRUB SERPL-MCNC: 0.54 MG/DL (ref 0.2–1)
BUN SERPL-MCNC: 11 MG/DL (ref 5–25)
CALCIUM SERPL-MCNC: 9.2 MG/DL (ref 8.4–10.2)
CHLORIDE SERPL-SCNC: 109 MMOL/L (ref 96–108)
CHOLEST SERPL-MCNC: 165 MG/DL (ref ?–200)
CO2 SERPL-SCNC: 25 MMOL/L (ref 21–32)
CREAT SERPL-MCNC: 0.72 MG/DL (ref 0.6–1.3)
EOSINOPHIL # BLD AUTO: 0.19 THOUSAND/ÂΜL (ref 0–0.61)
EOSINOPHIL NFR BLD AUTO: 4 % (ref 0–6)
ERYTHROCYTE [DISTWIDTH] IN BLOOD BY AUTOMATED COUNT: 14 % (ref 11.6–15.1)
EST. AVERAGE GLUCOSE BLD GHB EST-MCNC: 85 MG/DL
FERRITIN SERPL-MCNC: 40 NG/ML (ref 30–307)
GFR SERPL CREATININE-BSD FRML MDRD: 108 ML/MIN/1.73SQ M
GLUCOSE SERPL-MCNC: 98 MG/DL (ref 65–140)
HBA1C MFR BLD: 4.6 %
HCT VFR BLD AUTO: 39.3 % (ref 34.8–46.1)
HDLC SERPL-MCNC: 42 MG/DL
HGB BLD-MCNC: 13.4 G/DL (ref 11.5–15.4)
IMM GRANULOCYTES # BLD AUTO: 0.02 THOUSAND/UL (ref 0–0.2)
IMM GRANULOCYTES NFR BLD AUTO: 0 % (ref 0–2)
IRON SERPL-MCNC: 66 UG/DL (ref 50–212)
LDLC SERPL CALC-MCNC: 107 MG/DL (ref 0–100)
LYMPHOCYTES # BLD AUTO: 1.93 THOUSANDS/ÂΜL (ref 0.6–4.47)
LYMPHOCYTES NFR BLD AUTO: 37 % (ref 14–44)
MCH RBC QN AUTO: 29.2 PG (ref 26.8–34.3)
MCHC RBC AUTO-ENTMCNC: 34.1 G/DL (ref 31.4–37.4)
MCV RBC AUTO: 86 FL (ref 82–98)
MONOCYTES # BLD AUTO: 0.27 THOUSAND/ÂΜL (ref 0.17–1.22)
MONOCYTES NFR BLD AUTO: 5 % (ref 4–12)
NEUTROPHILS # BLD AUTO: 2.83 THOUSANDS/ÂΜL (ref 1.85–7.62)
NEUTS SEG NFR BLD AUTO: 53 % (ref 43–75)
NONHDLC SERPL-MCNC: 123 MG/DL
NRBC BLD AUTO-RTO: 0 /100 WBCS
PLATELET # BLD AUTO: 216 THOUSANDS/UL (ref 149–390)
PMV BLD AUTO: 10 FL (ref 8.9–12.7)
POTASSIUM SERPL-SCNC: 4.2 MMOL/L (ref 3.5–5.3)
PROT SERPL-MCNC: 8.1 G/DL (ref 6.4–8.4)
RBC # BLD AUTO: 4.59 MILLION/UL (ref 3.81–5.12)
SODIUM SERPL-SCNC: 140 MMOL/L (ref 135–147)
TRIGL SERPL-MCNC: 82 MG/DL (ref ?–150)
TSH SERPL DL<=0.05 MIU/L-ACNC: 1.43 UIU/ML (ref 0.45–4.5)
WBC # BLD AUTO: 5.29 THOUSAND/UL (ref 4.31–10.16)

## 2025-06-17 PROCEDURE — 82728 ASSAY OF FERRITIN: CPT

## 2025-06-17 PROCEDURE — 83540 ASSAY OF IRON: CPT

## 2025-06-17 PROCEDURE — 84443 ASSAY THYROID STIM HORMONE: CPT

## 2025-06-17 PROCEDURE — 85025 COMPLETE CBC W/AUTO DIFF WBC: CPT

## 2025-06-17 PROCEDURE — 83036 HEMOGLOBIN GLYCOSYLATED A1C: CPT

## 2025-06-17 PROCEDURE — 80061 LIPID PANEL: CPT

## 2025-06-17 PROCEDURE — 80053 COMPREHEN METABOLIC PANEL: CPT

## 2025-06-17 PROCEDURE — 36415 COLL VENOUS BLD VENIPUNCTURE: CPT

## (undated) DEVICE — STRL ALLENTOWN HYSTEROSCOPY PK: Brand: CARDINAL HEALTH

## (undated) DEVICE — GLOVE INDICATOR PI UNDERGLOVE SZ 7.5 BLUE

## (undated) DEVICE — LIGHT GLOVE GREEN

## (undated) DEVICE — DRESSING MEPILEX AG BORDER POST-OP 4 X 12 IN

## (undated) DEVICE — NEEDLE SPINAL 25G X 3.5 IN QUINCKE

## (undated) DEVICE — 3M™ STERI-STRIP™ COMPOUND BENZOIN TINCTURE 40 BAGS/CARTON 4 CARTONS/CASE C1544: Brand: 3M™ STERI-STRIP™

## (undated) DEVICE — SPONGE STICK WITH PVP-I: Brand: KENDALL

## (undated) DEVICE — SKIN MARKER DUAL TIP WITH RULER CAP, FLEXIBLE RULER AND LABELS: Brand: DEVON

## (undated) DEVICE — GLOVE PI ULTRA TOUCH SZ.7.0

## (undated) DEVICE — Device

## (undated) DEVICE — PACK C-SECTION PBDS

## (undated) DEVICE — D + E SAFE TOUCH TISSUE TRAP (CIRCON)

## (undated) DEVICE — DECANTER: Brand: UNBRANDED

## (undated) DEVICE — ABDOMINAL PAD: Brand: DERMACEA

## (undated) DEVICE — 3M™ STERI-STRIP™ REINFORCED ADHESIVE SKIN CLOSURES, R1547, 1/2 IN X 4 IN (12 MM X 100 MM), 6 STRIPS/ENVELOPE: Brand: 3M™ STERI-STRIP™

## (undated) DEVICE — 1820 FOAM BLOCK NEEDLE COUNTER: Brand: DEVON

## (undated) DEVICE — ARTHROSCOPY FLOOR MAT

## (undated) DEVICE — SUT VICRYL 4-0 PS-2 18 IN J496G

## (undated) DEVICE — SUT VICRYL 0 CTX 36 IN J978H

## (undated) DEVICE — SYRINGE 10ML LL CONTROL TOP

## (undated) DEVICE — CURETTE VACURETTE CRVD 8MM

## (undated) DEVICE — TELFA NON-ADHERENT ABSORBENT DRESSING: Brand: TELFA

## (undated) DEVICE — CHLORAPREP HI-LITE 26ML ORANGE

## (undated) DEVICE — GLOVE SRG LF STRL BGL SKNSNS 7.5 PF

## (undated) DEVICE — COLLECTION SET, DISPOSABLE WITH HANDLE AND TAPERED FITTINGS TUBING, 6 FT (183 CM): Brand: GYRUS ACMI